# Patient Record
Sex: FEMALE | Race: WHITE | NOT HISPANIC OR LATINO | Employment: FULL TIME | URBAN - NONMETROPOLITAN AREA
[De-identification: names, ages, dates, MRNs, and addresses within clinical notes are randomized per-mention and may not be internally consistent; named-entity substitution may affect disease eponyms.]

---

## 2018-02-23 ENCOUNTER — LAB REQUISITION (OUTPATIENT)
Dept: LAB | Facility: HOSPITAL | Age: 38
End: 2018-02-23

## 2018-02-23 DIAGNOSIS — Z00.00 ENCOUNTER FOR GENERAL ADULT MEDICAL EXAMINATION WITHOUT ABNORMAL FINDINGS: ICD-10-CM

## 2018-02-23 PROCEDURE — 88305 TISSUE EXAM BY PATHOLOGIST: CPT | Performed by: PLASTIC SURGERY

## 2018-02-27 LAB
CYTO UR: NORMAL
DX PRELIMINARY: NORMAL
LAB AP CASE REPORT: NORMAL
LAB AP CLINICAL INFORMATION: NORMAL
Lab: NORMAL
PATH REPORT.FINAL DX SPEC: NORMAL
PATH REPORT.GROSS SPEC: NORMAL

## 2018-05-11 ENCOUNTER — TRANSCRIBE ORDERS (OUTPATIENT)
Dept: ADMINISTRATIVE | Facility: HOSPITAL | Age: 38
End: 2018-05-11

## 2018-05-11 ENCOUNTER — LAB (OUTPATIENT)
Dept: LAB | Facility: HOSPITAL | Age: 38
End: 2018-05-11

## 2018-05-11 DIAGNOSIS — Z11.3 SCREENING EXAMINATION FOR VENEREAL DISEASE: Primary | ICD-10-CM

## 2018-05-11 DIAGNOSIS — Z11.3 SCREENING EXAMINATION FOR VENEREAL DISEASE: ICD-10-CM

## 2018-05-11 DIAGNOSIS — Z71.41 ENCOUNTER FOR ALCOHOLISM COUNSELING: ICD-10-CM

## 2018-05-11 LAB
ABO GROUP BLD: NORMAL
BASOPHILS # BLD AUTO: 0.07 10*3/MM3 (ref 0–0.2)
BASOPHILS NFR BLD AUTO: 1 % (ref 0–2)
DEPRECATED RDW RBC AUTO: 40.3 FL (ref 40–54)
EOSINOPHIL # BLD AUTO: 0.05 10*3/MM3 (ref 0–0.7)
EOSINOPHIL NFR BLD AUTO: 0.7 % (ref 0–4)
ERYTHROCYTE [DISTWIDTH] IN BLOOD BY AUTOMATED COUNT: 11.9 % (ref 12–15)
HBV SURFACE AG SERPL QL IA: NEGATIVE
HCT VFR BLD AUTO: 36.7 % (ref 37–47)
HCV AB SER DONR QL: NEGATIVE
HCV S/C RATIO: 0.01 (ref 0–0.99)
HGB BLD-MCNC: 12.5 G/DL (ref 12–16)
HIV1+2 AB SER QL: NEGATIVE
IMM GRANULOCYTES # BLD: 0.03 10*3/MM3 (ref 0–0.03)
IMM GRANULOCYTES NFR BLD: 0.4 % (ref 0–5)
LYMPHOCYTES # BLD AUTO: 2.2 10*3/MM3 (ref 0.72–4.86)
LYMPHOCYTES NFR BLD AUTO: 32.2 % (ref 15–45)
MCH RBC QN AUTO: 31.5 PG (ref 28–32)
MCHC RBC AUTO-ENTMCNC: 34.1 G/DL (ref 33–36)
MCV RBC AUTO: 92.4 FL (ref 82–98)
MONOCYTES # BLD AUTO: 0.43 10*3/MM3 (ref 0.19–1.3)
MONOCYTES NFR BLD AUTO: 6.3 % (ref 4–12)
NEUTROPHILS # BLD AUTO: 4.06 10*3/MM3 (ref 1.87–8.4)
NEUTROPHILS NFR BLD AUTO: 59.4 % (ref 39–78)
NRBC BLD MANUAL-RTO: 0 /100 WBC (ref 0–0)
PLATELET # BLD AUTO: 279 10*3/MM3 (ref 130–400)
PMV BLD AUTO: 10.7 FL (ref 6–12)
RBC # BLD AUTO: 3.97 10*6/MM3 (ref 4.2–5.4)
RH BLD: POSITIVE
WBC NRBC COR # BLD: 6.84 10*3/MM3 (ref 4.8–10.8)

## 2018-05-11 PROCEDURE — 36415 COLL VENOUS BLD VENIPUNCTURE: CPT

## 2018-05-11 PROCEDURE — 87389 HIV-1 AG W/HIV-1&-2 AB AG IA: CPT | Performed by: OBSTETRICS & GYNECOLOGY

## 2018-05-11 PROCEDURE — 86704 HEP B CORE ANTIBODY TOTAL: CPT | Performed by: OBSTETRICS & GYNECOLOGY

## 2018-05-11 PROCEDURE — 86803 HEPATITIS C AB TEST: CPT | Performed by: OBSTETRICS & GYNECOLOGY

## 2018-05-11 PROCEDURE — 85025 COMPLETE CBC W/AUTO DIFF WBC: CPT | Performed by: OBSTETRICS & GYNECOLOGY

## 2018-05-11 PROCEDURE — 86592 SYPHILIS TEST NON-TREP QUAL: CPT | Performed by: OBSTETRICS & GYNECOLOGY

## 2018-05-11 PROCEDURE — 87591 N.GONORRHOEAE DNA AMP PROB: CPT | Performed by: OBSTETRICS & GYNECOLOGY

## 2018-05-11 PROCEDURE — 87340 HEPATITIS B SURFACE AG IA: CPT | Performed by: OBSTETRICS & GYNECOLOGY

## 2018-05-11 PROCEDURE — 86901 BLOOD TYPING SEROLOGIC RH(D): CPT | Performed by: OBSTETRICS & GYNECOLOGY

## 2018-05-11 PROCEDURE — 87491 CHLMYD TRACH DNA AMP PROBE: CPT | Performed by: OBSTETRICS & GYNECOLOGY

## 2018-05-11 PROCEDURE — 86900 BLOOD TYPING SEROLOGIC ABO: CPT | Performed by: OBSTETRICS & GYNECOLOGY

## 2018-05-11 PROCEDURE — G0432 EIA HIV-1/HIV-2 SCREEN: HCPCS | Performed by: OBSTETRICS & GYNECOLOGY

## 2018-05-12 LAB
HBV CORE AB SER DONR QL IA: NEGATIVE
RPR SER QL: NON REACTIVE

## 2018-05-14 LAB
C TRACH RRNA SPEC DONR QL NAA+PROBE: NEGATIVE
N GONORRHOEA DNA SPEC QL NAA+PROBE: NEGATIVE

## 2018-05-15 LAB — HIV 1+2 AB+HIV1 P24 AG SERPL QL IA: NON REACTIVE

## 2018-12-07 ENCOUNTER — OFFICE VISIT (OUTPATIENT)
Dept: OBSTETRICS AND GYNECOLOGY | Facility: CLINIC | Age: 38
End: 2018-12-07

## 2018-12-07 VITALS
BODY MASS INDEX: 25.33 KG/M2 | SYSTOLIC BLOOD PRESSURE: 116 MMHG | WEIGHT: 171 LBS | HEIGHT: 69 IN | DIASTOLIC BLOOD PRESSURE: 74 MMHG

## 2018-12-07 DIAGNOSIS — Z01.419 WELL WOMAN EXAM WITH ROUTINE GYNECOLOGICAL EXAM: Primary | ICD-10-CM

## 2018-12-07 DIAGNOSIS — M19.90 ARTHRITIS: ICD-10-CM

## 2018-12-07 PROCEDURE — 87624 HPV HI-RISK TYP POOLED RSLT: CPT | Performed by: OBSTETRICS & GYNECOLOGY

## 2018-12-07 PROCEDURE — G0123 SCREEN CERV/VAG THIN LAYER: HCPCS | Performed by: OBSTETRICS & GYNECOLOGY

## 2018-12-07 PROCEDURE — 99395 PREV VISIT EST AGE 18-39: CPT | Performed by: OBSTETRICS & GYNECOLOGY

## 2018-12-07 RX ORDER — VALACYCLOVIR HYDROCHLORIDE 500 MG/1
500 TABLET, FILM COATED ORAL DAILY
COMMUNITY
End: 2019-10-03 | Stop reason: SDUPTHER

## 2018-12-07 RX ORDER — DESOGESTREL AND ETHINYL ESTRADIOL 0.15-0.03
1 KIT ORAL DAILY
Qty: 28 TABLET | Refills: 12 | Status: SHIPPED | OUTPATIENT
Start: 2018-12-07 | End: 2022-03-30

## 2018-12-12 LAB
GEN CATEG CVX/VAG CYTO-IMP: NORMAL
LAB AP CASE REPORT: NORMAL
LAB AP GYN ADDITIONAL INFORMATION: NORMAL
PATH INTERP SPEC-IMP: NORMAL
STAT OF ADQ CVX/VAG CYTO-IMP: NORMAL

## 2018-12-17 LAB — HPV I/H RISK 4 DNA CVX QL PROBE+SIG AMP: NEGATIVE

## 2018-12-17 NOTE — PROGRESS NOTES
Clifton Macdonald is a 38 y.o. y/o female.     Chief Complaint: Here for a Pap smear    HPI:   38 y.o. y/o No obstetric history on file..  No LMP recorded..  Patient is here for Pap smear is actively undergoing IVF therapy.  Discussed this and implications on ovarian cancer and other health issues.  Questions answered    She does complain of some arthritis-like symptoms joint tenderness and swelling not terribly bad but does come and go and worse in the late nica          Review of Systems   Breast ROS: negative for breast lumps ROS:                                                                                                                                  Neuro no history of brain tumor    HENT no history of ear tumors    Eye no history of retinal tumors    Pulmonary no history of lung tumors    Cardiac no history of cardiac tumors    GI: No history of small bowel tumors    Musculoskeletal: No history of skeletal muscle tumors    Endocrine: No history of adrenal tumors    Lymphatic: No history of Hodgkin's disease    Renal: No history of renal cancer    The following portions of the patient's history were reviewed and updated as appropriate: allergies, current medications, past family history, past medical history, past social history, past surgical history and problem list.    Allergies   Allergen Reactions   • Other Hives     Pt states she had a reaction to general anesthesia    • Neomycin Rash        Outpatient Medications Prior to Visit   Medication Sig Dispense Refill   • DAILY MULTIPLE VITAMINS PO Take 1 tablet by mouth Daily.     • valACYclovir (VALTREX) 500 MG tablet Take 500 mg by mouth Daily.     • Vortioxetine HBr (TRINTELLIX) 5 MG tablet Take 5 mg by mouth Daily.       No facility-administered medications prior to visit.         The patient has a family history of   History reviewed. No pertinent family history.     Past Medical History:   Diagnosis Date   • Herpes    • Migraine         OB History   "   No data available           Social History     Socioeconomic History   • Marital status:      Spouse name: Not on file   • Number of children: Not on file   • Years of education: Not on file   • Highest education level: Not on file   Social Needs   • Financial resource strain: Not on file   • Food insecurity - worry: Not on file   • Food insecurity - inability: Not on file   • Transportation needs - medical: Not on file   • Transportation needs - non-medical: Not on file   Occupational History   • Not on file   Tobacco Use   • Smoking status: Never Smoker   • Smokeless tobacco: Never Used   Substance and Sexual Activity   • Alcohol use: Yes     Comment: socially   • Drug use: No   • Sexual activity: Yes     Partners: Male     Birth control/protection: OCP   Other Topics Concern   • Not on file   Social History Narrative   • Not on file        Past Surgical History:   Procedure Laterality Date   • BREAST AUGMENTATION     • DILATATION AND CURETTAGE     • VULVA SURGERY          There is no problem list on file for this patient.       Documented Vitals    12/07/18 0920   BP: 116/74   Weight: 77.6 kg (171 lb)   Height: 175.3 cm (69\")   PainSc: 0-No pain        Body mass index is 25.25 kg/m².    Physical Exam   Constitutional: She is oriented to person, place, and time. She appears well-developed and well-nourished.   HENT:   Head: Normocephalic and atraumatic.   Eyes: EOM are normal. Pupils are equal, round, and reactive to light.   Neck: Normal range of motion. Neck supple.   Cardiovascular: Normal rate and regular rhythm.   Pulmonary/Chest: Effort normal.   Abdominal: Bowel sounds are normal.   Genitourinary: Vagina normal.   Genitourinary Comments: Cervix no gross lesion Pap smear performed bimanual examination I can detect no pelvic enlargement   Musculoskeletal: Normal range of motion.   Neurological: She is alert and oriented to person, place, and time.   Skin: Skin is warm and dry. Capillary refill takes " less than 2 seconds.   Psychiatric: She has a normal mood and affect.       Assessment        Diagnosis Plan   1. Well woman exam with routine gynecological exam  TSH    T4, Free    Vitamin D 25 Hydroxy    Vitamin B12    CBC & Differential    Comprehensive Metabolic Panel    RHEUMATOID FACTOR    CBC & Differential    Liquid-based Pap Smear, Screening    HPV DNA Probe, Direct - ThinPrep Vial, Vagina    HPV DNA Probe, Direct - ThinPrep Vial, Cervix   2. Arthritis  CBC & Differential         Plan         New Medications Ordered This Visit   Medications   • desogestrel-ethinyl estradiol (APRI) 0.15-30 MG-MCG per tablet     Sig: Take 1 tablet by mouth Daily.     Dispense:  28 tablet     Refill:  12     1. Encouraged in diet and exercise.  2. Handouts on depression, hot flashes, exercise, and vitamin use.   3. Follow-up in 1 year.  Follow-up sooner as needed.          This document has been electronically signed by Lorenzo Briceno MD on December 16, 2018 11:26 PM

## 2019-01-02 DIAGNOSIS — E53.8 LOW SERUM VITAMIN B12: Primary | ICD-10-CM

## 2019-01-03 DIAGNOSIS — R53.83 OTHER FATIGUE: Primary | ICD-10-CM

## 2019-03-28 RX ORDER — UBIDECARENONE 75 MG
50 CAPSULE ORAL DAILY
Qty: 30 TABLET | Refills: 5 | Status: SHIPPED | OUTPATIENT
Start: 2019-03-28 | End: 2019-04-27

## 2019-10-03 RX ORDER — VALACYCLOVIR HYDROCHLORIDE 500 MG/1
TABLET, FILM COATED ORAL
Qty: 30 TABLET | Refills: 1 | Status: SHIPPED | OUTPATIENT
Start: 2019-10-03 | End: 2020-06-24

## 2019-12-08 DIAGNOSIS — O20.0 THREATENED ABORTION: Primary | ICD-10-CM

## 2019-12-09 ENCOUNTER — OFFICE VISIT (OUTPATIENT)
Dept: OBSTETRICS AND GYNECOLOGY | Facility: CLINIC | Age: 39
End: 2019-12-09

## 2019-12-09 ENCOUNTER — LAB (OUTPATIENT)
Dept: LAB | Facility: HOSPITAL | Age: 39
End: 2019-12-09

## 2019-12-09 VITALS
SYSTOLIC BLOOD PRESSURE: 130 MMHG | DIASTOLIC BLOOD PRESSURE: 80 MMHG | BODY MASS INDEX: 24.07 KG/M2 | WEIGHT: 158.8 LBS | HEIGHT: 68 IN

## 2019-12-09 DIAGNOSIS — O03.4 INCOMPLETE ABORTION: Primary | ICD-10-CM

## 2019-12-09 DIAGNOSIS — R53.83 OTHER FATIGUE: ICD-10-CM

## 2019-12-09 DIAGNOSIS — O20.0 THREATENED ABORTION: ICD-10-CM

## 2019-12-09 DIAGNOSIS — O20.0 THREATENED ABORTION: Primary | ICD-10-CM

## 2019-12-09 DIAGNOSIS — E53.8 LOW SERUM VITAMIN B12: ICD-10-CM

## 2019-12-09 LAB
BASOPHILS # BLD AUTO: 0.06 10*3/MM3 (ref 0–0.2)
BASOPHILS NFR BLD AUTO: 0.6 % (ref 0–1.5)
DEPRECATED RDW RBC AUTO: 39.2 FL (ref 37–54)
EOSINOPHIL # BLD AUTO: 0.08 10*3/MM3 (ref 0–0.4)
EOSINOPHIL NFR BLD AUTO: 0.7 % (ref 0.3–6.2)
ERYTHROCYTE [DISTWIDTH] IN BLOOD BY AUTOMATED COUNT: 12.1 % (ref 12.3–15.4)
HCG INTACT+B SERPL-ACNC: NORMAL MIU/ML
HCT VFR BLD AUTO: 39.3 % (ref 34–46.6)
HGB BLD-MCNC: 13.6 G/DL (ref 12–15.9)
IMM GRANULOCYTES # BLD AUTO: 0.03 10*3/MM3 (ref 0–0.05)
IMM GRANULOCYTES NFR BLD AUTO: 0.3 % (ref 0–0.5)
LYMPHOCYTES # BLD AUTO: 3.73 10*3/MM3 (ref 0.7–3.1)
LYMPHOCYTES NFR BLD AUTO: 34.5 % (ref 19.6–45.3)
MAGNESIUM SERPL-MCNC: 2.1 MG/DL (ref 1.6–2.6)
MCH RBC QN AUTO: 30.7 PG (ref 26.6–33)
MCHC RBC AUTO-ENTMCNC: 34.6 G/DL (ref 31.5–35.7)
MCV RBC AUTO: 88.7 FL (ref 79–97)
MONOCYTES # BLD AUTO: 0.49 10*3/MM3 (ref 0.1–0.9)
MONOCYTES NFR BLD AUTO: 4.5 % (ref 5–12)
NEUTROPHILS # BLD AUTO: 6.42 10*3/MM3 (ref 1.7–7)
NEUTROPHILS NFR BLD AUTO: 59.4 % (ref 42.7–76)
NRBC BLD AUTO-RTO: 0 /100 WBC (ref 0–0.2)
PLATELET # BLD AUTO: 301 10*3/MM3 (ref 140–450)
PMV BLD AUTO: 11.4 FL (ref 6–12)
RBC # BLD AUTO: 4.43 10*6/MM3 (ref 3.77–5.28)
WBC NRBC COR # BLD: 10.81 10*3/MM3 (ref 3.4–10.8)

## 2019-12-09 PROCEDURE — 36415 COLL VENOUS BLD VENIPUNCTURE: CPT

## 2019-12-09 PROCEDURE — 88305 TISSUE EXAM BY PATHOLOGIST: CPT | Performed by: PATHOLOGY

## 2019-12-09 PROCEDURE — 99214 OFFICE O/P EST MOD 30 MIN: CPT | Performed by: OBSTETRICS & GYNECOLOGY

## 2019-12-09 PROCEDURE — 83735 ASSAY OF MAGNESIUM: CPT

## 2019-12-09 PROCEDURE — 86901 BLOOD TYPING SEROLOGIC RH(D): CPT

## 2019-12-09 PROCEDURE — 88342 IMHCHEM/IMCYTCHM 1ST ANTB: CPT | Performed by: PATHOLOGY

## 2019-12-09 PROCEDURE — 85025 COMPLETE CBC W/AUTO DIFF WBC: CPT

## 2019-12-09 PROCEDURE — 88305 TISSUE EXAM BY PATHOLOGIST: CPT | Performed by: OBSTETRICS & GYNECOLOGY

## 2019-12-09 PROCEDURE — 84702 CHORIONIC GONADOTROPIN TEST: CPT

## 2019-12-09 PROCEDURE — 86850 RBC ANTIBODY SCREEN: CPT

## 2019-12-09 PROCEDURE — 86900 BLOOD TYPING SEROLOGIC ABO: CPT

## 2019-12-09 PROCEDURE — 82746 ASSAY OF FOLIC ACID SERUM: CPT

## 2019-12-09 RX ORDER — PRENATAL VIT/IRON FUM/FOLIC AC 27MG-0.8MG
TABLET ORAL DAILY
COMMUNITY

## 2019-12-09 RX ORDER — OXYCODONE AND ACETAMINOPHEN 10; 325 MG/1; MG/1
1 TABLET ORAL EVERY 6 HOURS PRN
Qty: 18 TABLET | Refills: 0 | Status: SHIPPED | OUTPATIENT
Start: 2019-12-09 | End: 2021-08-09

## 2019-12-09 RX ORDER — CYANOCOBALAMIN 1000 UG/ML
1000 INJECTION, SOLUTION INTRAMUSCULAR; SUBCUTANEOUS
Refills: 5 | COMMUNITY
Start: 2019-11-26 | End: 2021-08-09 | Stop reason: HOSPADM

## 2019-12-09 RX ORDER — SYRINGE WITH NEEDLE, 1 ML 25GX5/8"
SYRINGE, EMPTY DISPOSABLE MISCELLANEOUS
Refills: 11 | COMMUNITY
Start: 2019-11-26 | End: 2021-08-09 | Stop reason: HOSPADM

## 2019-12-10 LAB
ABO GROUP BLD: NORMAL
BLD GP AB SCN SERPL QL: NEGATIVE
FOLATE SERPL-MCNC: >20 NG/ML (ref 4.78–24.2)
Lab: NORMAL
RH BLD: POSITIVE
T&S EXPIRATION DATE: NORMAL

## 2019-12-11 ENCOUNTER — LAB (OUTPATIENT)
Dept: LAB | Facility: HOSPITAL | Age: 39
End: 2019-12-11

## 2019-12-11 DIAGNOSIS — O03.4 INCOMPLETE ABORTION: Primary | ICD-10-CM

## 2019-12-11 DIAGNOSIS — O03.4 INCOMPLETE ABORTION: ICD-10-CM

## 2019-12-11 LAB
BASOPHILS # BLD AUTO: 0.06 10*3/MM3 (ref 0–0.2)
BASOPHILS NFR BLD AUTO: 0.8 % (ref 0–1.5)
DEPRECATED RDW RBC AUTO: 39.7 FL (ref 37–54)
EOSINOPHIL # BLD AUTO: 0.16 10*3/MM3 (ref 0–0.4)
EOSINOPHIL NFR BLD AUTO: 2.2 % (ref 0.3–6.2)
ERYTHROCYTE [DISTWIDTH] IN BLOOD BY AUTOMATED COUNT: 12.2 % (ref 12.3–15.4)
HCG INTACT+B SERPL-ACNC: 3584 MIU/ML
HCG SERPL QL: POSITIVE
HCT VFR BLD AUTO: 37.5 % (ref 34–46.6)
HGB BLD-MCNC: 12.8 G/DL (ref 12–15.9)
IMM GRANULOCYTES # BLD AUTO: 0.02 10*3/MM3 (ref 0–0.05)
IMM GRANULOCYTES NFR BLD AUTO: 0.3 % (ref 0–0.5)
LAB AP CASE REPORT: NORMAL
LAB AP INTRADEPARTMENTAL CONSULT: NORMAL
LAB AP SPECIAL STAINS: NORMAL
LYMPHOCYTES # BLD AUTO: 3.07 10*3/MM3 (ref 0.7–3.1)
LYMPHOCYTES NFR BLD AUTO: 42.4 % (ref 19.6–45.3)
MCH RBC QN AUTO: 30.4 PG (ref 26.6–33)
MCHC RBC AUTO-ENTMCNC: 34.1 G/DL (ref 31.5–35.7)
MCV RBC AUTO: 89.1 FL (ref 79–97)
MONOCYTES # BLD AUTO: 0.4 10*3/MM3 (ref 0.1–0.9)
MONOCYTES NFR BLD AUTO: 5.5 % (ref 5–12)
NEUTROPHILS # BLD AUTO: 3.53 10*3/MM3 (ref 1.7–7)
NEUTROPHILS NFR BLD AUTO: 48.8 % (ref 42.7–76)
NRBC BLD AUTO-RTO: 0 /100 WBC (ref 0–0.2)
PATH REPORT.FINAL DX SPEC: NORMAL
PATH REPORT.GROSS SPEC: NORMAL
PLATELET # BLD AUTO: 272 10*3/MM3 (ref 140–450)
PMV BLD AUTO: 11 FL (ref 6–12)
RBC # BLD AUTO: 4.21 10*6/MM3 (ref 3.77–5.28)
WBC NRBC COR # BLD: 7.24 10*3/MM3 (ref 3.4–10.8)

## 2019-12-11 PROCEDURE — 36415 COLL VENOUS BLD VENIPUNCTURE: CPT

## 2019-12-11 PROCEDURE — 84703 CHORIONIC GONADOTROPIN ASSAY: CPT

## 2019-12-11 PROCEDURE — 85025 COMPLETE CBC W/AUTO DIFF WBC: CPT

## 2019-12-11 PROCEDURE — 84702 CHORIONIC GONADOTROPIN TEST: CPT

## 2019-12-13 ENCOUNTER — LAB (OUTPATIENT)
Dept: LAB | Facility: HOSPITAL | Age: 39
End: 2019-12-13

## 2019-12-13 ENCOUNTER — OFFICE VISIT (OUTPATIENT)
Dept: OBSTETRICS AND GYNECOLOGY | Facility: CLINIC | Age: 39
End: 2019-12-13

## 2019-12-13 VITALS
HEIGHT: 68 IN | SYSTOLIC BLOOD PRESSURE: 120 MMHG | WEIGHT: 160 LBS | BODY MASS INDEX: 24.25 KG/M2 | DIASTOLIC BLOOD PRESSURE: 78 MMHG

## 2019-12-13 DIAGNOSIS — Z00.00 ENCOUNTER FOR WELLNESS EXAMINATION: ICD-10-CM

## 2019-12-13 DIAGNOSIS — O03.4 INCOMPLETE ABORTION: Primary | ICD-10-CM

## 2019-12-13 DIAGNOSIS — O03.4 INCOMPLETE ABORTION: ICD-10-CM

## 2019-12-13 LAB — HCG INTACT+B SERPL-ACNC: 2181 MIU/ML

## 2019-12-13 PROCEDURE — 99396 PREV VISIT EST AGE 40-64: CPT | Performed by: OBSTETRICS & GYNECOLOGY

## 2019-12-13 PROCEDURE — 36415 COLL VENOUS BLD VENIPUNCTURE: CPT

## 2019-12-13 PROCEDURE — 84702 CHORIONIC GONADOTROPIN TEST: CPT

## 2019-12-13 NOTE — PROGRESS NOTES
"Chief complaint here for well woman visit                                                                                                                                                                                                                                                               sUBJECTIVE: P39 y.o. female for annual routine Pap and checkupatient here for well woman visit.  She is up on her cervical cytology.  She uses her seatbelts.  History of HSV no recent recurrence on suppression.  She wants to start oral contraceptives does not smoke.  Relatively good health oral contraceptive review of systems negative.    Recent miscarriage from spontaneous conception though has been undergoing IVF.  hCG decreasing rapidly repeat ~0    Current Outpatient Medications   Medication Sig Dispense Refill   • B-D 3CC LUER-ILENE SYR 25GX1\" 25G X 1\" 3 ML misc USE AS DIRECTED WITH B-12 INJECTION  11   • Cyanocobalamin (VITAMIN B 12 PO) Take  by mouth.     • cyanocobalamin 1000 MCG/ML injection 1,000 mcg. Every 3 weeks  5   • Cyanocobalamin 1000 MCG/ML kit INJECT 1ML INTRAMUSCULARLY EVERY WEEK     • DAILY MULTIPLE VITAMINS PO Take 1 tablet by mouth Daily.     • oxyCODONE-acetaminophen (PERCOCET)  MG per tablet Take 1 tablet by mouth Every 6 (Six) Hours As Needed for Moderate Pain . 18 tablet 0   • Prenatal Vit-Fe Fumarate-FA (PRENATAL VITAMIN 27-0.8) 27-0.8 MG tablet tablet Take  by mouth Daily.     • valACYclovir (VALTREX) 500 MG tablet TAKE 1 TABLET BY MOUTH EVERY DAY FOR 30 DAYS 30 tablet 1   • Vortioxetine HBr (TRINTELLIX) 5 MG tablet Take 5 mg by mouth Daily.       No current facility-administered medications for this visit.      Allergies: Neomycin   Patient's last menstrual period was 09/28/2019.    ROS:  Feeling well. No dyspnea or chest pain on exertion.  No abdominal pain, change in bowel habits, black or bloody stools.  No urinary tract symptoms. GYN ROS: she complains of   . No neurological " "complaints.    OBJECTIVE:   The patient appears well, alert, oriented x 3, in no distress.  /78 (BP Location: Left arm, Patient Position: Sitting, Cuff Size: Adult)   Ht 172.7 cm (68\")   Wt 72.6 kg (160 lb)   LMP 09/28/2019   BMI 24.33 kg/m²   ENT normal.  Neck supple. No adenopathy or thyromegaly. MAJOR. Lungs are clear, good air entry, no wheezes, rhonchi or rales. S1 and S2 normal, no murmurs, regular rate and rhythm. Abdomen soft without tenderness, guarding, mass or organomegaly. Extremities show no edema, normal peripheral pulses. Neurological is normal, no focal findings.    BREAST EXAM: breasts appear normal, no suspicious masses, no skin or nipple changes or axillary nodes, not examined    PELVIC EXAM external genitalia normal moderate amount of blood in vault cervix closed uterus moderately enlarged I am to take no adnexal enlargement    ASSESSMENT:   well woman; recent apparent spontaneous miscarriage    PLAN:   Well woman examination see back 1 year with mammogram.;  Recent spontaneous miscarriage follow-up beta hCG down to 0  "

## 2019-12-14 NOTE — PROGRESS NOTES
"Clifton Macdonald is a 39 y.o. y/o female.     Chief Complaint: Heavy bleeding early pregnancy    HPI:   39 y.o. .  Patient's last menstrual period was 2019..  Patient with positive pregnancy test.  She had been trying to conceive and in fact had had a couple of cycles of IVF at [facility] without success.  Then conceived spontaneously.  Began to have some spotting over the weekend I had spoken with her.  Plan was to obtain laboratory studies do not want emergency evaluation as course could not be changed.  Had increasing bleeding the afternoon of 2019 she was bringing her child to [facility] for a sleep study.  I had her come to the office.  She was having large amounts of bright red vaginal bleeding for about 2 hours.  Heavier than a pad an hour much heavier than a.  No clots no definite tissue.  Associated crampy pain 8 of 10          Review of Systems   ROS:  CNS: No history of brain malignancy.  HEENT: No history of throat cancer.  Eye: No history of retinal cancer.  Pulmonary: No history of lung cancer.                                                                                 Cardiovascular: No history of cardiac tumors.  Gastrointestinal: No history of small bowel tumors.  Renal: No history of kidney malignancy.  Musculoskeletal: No history of smooth muscle tumors.  Lymphatics: No history of Hodgkin's disease.  Endocrine: No history of thyroid malignancy.    The following portions of the patient's history were reviewed and updated as appropriate: allergies, current medications, past family history, past medical history, past social history, past surgical history and problem list.    Allergies   Allergen Reactions   • Neomycin Rash        Outpatient Medications Prior to Visit   Medication Sig Dispense Refill   • B-D 3CC LUER-ILENE SYR 25GX1\" 25G X 1\" 3 ML misc USE AS DIRECTED WITH B-12 INJECTION  11   • Cyanocobalamin (VITAMIN B 12 PO) Take  by mouth.     • cyanocobalamin 1000 " MCG/ML injection 1,000 mcg. Every 3 weeks  5   • Cyanocobalamin 1000 MCG/ML kit INJECT 1ML INTRAMUSCULARLY EVERY WEEK     • Prenatal Vit-Fe Fumarate-FA (PRENATAL VITAMIN 27-0.8) 27-0.8 MG tablet tablet Take  by mouth Daily.     • valACYclovir (VALTREX) 500 MG tablet TAKE 1 TABLET BY MOUTH EVERY DAY FOR 30 DAYS 30 tablet 1   • DAILY MULTIPLE VITAMINS PO Take 1 tablet by mouth Daily.     • Vortioxetine HBr (TRINTELLIX) 5 MG tablet Take 5 mg by mouth Daily.       No facility-administered medications prior to visit.         The patient has a family history of   Family History   Problem Relation Age of Onset   • No Known Problems Father    • Rheum arthritis Mother    • No Known Problems Brother    • No Known Problems Son    • No Known Problems Daughter    • Rheum arthritis Maternal Grandmother    • No Known Problems Daughter         Past Medical History:   Diagnosis Date   • Herpes    • Migraine         OB History        1    Para        Term                AB        Living           SAB        TAB        Ectopic        Molar        Multiple        Live Births                     Social History     Socioeconomic History   • Marital status:      Spouse name: Not on file   • Number of children: Not on file   • Years of education: Not on file   • Highest education level: Not on file   Tobacco Use   • Smoking status: Never Smoker   • Smokeless tobacco: Never Used   Substance and Sexual Activity   • Alcohol use: Not Currently     Comment: socially   • Drug use: No   • Sexual activity: Yes     Partners: Male     Birth control/protection: OCP        Past Surgical History:   Procedure Laterality Date   • BREAST AUGMENTATION     • DILATATION AND CURETTAGE     • OTHER SURGICAL HISTORY  10/2018    IVF   • VULVA SURGERY          Patient Active Problem List   Diagnosis   • Encounter for wellness examination   • Incomplete         Documented Vitals    19 1617   BP: 130/80   Weight: 72 kg (158 lb 12.8  "oz)   Height: 172.7 cm (68\")   PainSc:   6   PainLoc: Abdomen        Body mass index is 24.15 kg/m².    Physical Exam  Constitutional: Appears to be in no acute distress; Eyes: sclera normal; Endocrine system: thyroid palpate is normal; Pulmonary system: lungs clear; Cardiovascular system: heart regular rate and rhythm; Gastrointestinal system: abdomen soft nontender, active bowel sounds; Urologic system: CVA negative; Psychiatric: appropriate insight; Neurologic: gait within normal limits female genital system external genitalia with some blood on labia large amount of bright red blood in vault.  On clearing this with proctoscopy swabs there was some tissue per cervical loss which was definitely not blood clot this was removed sent for pathologic examination after this was done the bleeding substantially decreased and patient reported cramping decreased bimanual examination not done    Laboratory Data:   No results for input(s): GLUCOSE, BUN, CREATININE, NA, K, CL, CO2, CALCIUM, PROTEINTOT, ALBUMIN, ALT, AST, ALKPHOS, BILITOT, EGFRIFNONA, GLOB, AGRATIO, BCR, ANIONGAP, BILIDIR, INDBILI in the last 08660 hours.  Lab Results - Last 18 Months   Lab Units 19  1756 19  1602   WBC 10*3/mm3 7.24 10.81*   RBC 10*6/mm3 4.21 4.43   HEMOGLOBIN g/dL 12.8 13.6   HEMATOCRIT % 37.5 39.3   MCV fL 89.1 88.7   MCH pg 30.4 30.7   MCHC g/dL 34.1 34.6   RDW % 12.2* 12.1*   RDW-SD fl 39.7 39.2   MPV fL 11.0 11.4   PLATELETS 10*3/mm3 272 301     Lab Results - Last 18 Months   Lab Units 19  1756   HCG QUALITATIVE  Positive*       Assessment        Diagnosis Plan   1. Incomplete   Tissue Pathology Exam    CBC Auto Differential    hCG, Serum, Qualitative    oxyCODONE-acetaminophen (PERCOCET)  MG per tablet         Plan  Incomplete possibly now complete  bleeding has slowed down after removing tissue from office we observe the patient for about 30 minutes I discussed admission to observation bed " possible suction D&C but she wants to go home which I think is reasonable.  She is now let me know overnight if she has return of heavy bleeding fever chills or other problems if not we will follow her up on Friday.  I have looked at her blood type recently and is Rh+       New Medications Ordered This Visit   Medications   • oxyCODONE-acetaminophen (PERCOCET)  MG per tablet     Sig: Take 1 tablet by mouth Every 6 (Six) Hours As Needed for Moderate Pain .     Dispense:  18 tablet     Refill:  0             This document has been electronically signed by Lorenzo Briceno MD on December 14, 2019 10:27 AM    Please note that portions of this note were completed with a voice recognition program.

## 2019-12-20 ENCOUNTER — LAB (OUTPATIENT)
Dept: LAB | Facility: HOSPITAL | Age: 39
End: 2019-12-20

## 2019-12-20 DIAGNOSIS — O03.4 INCOMPLETE ABORTION: ICD-10-CM

## 2019-12-20 PROCEDURE — 36415 COLL VENOUS BLD VENIPUNCTURE: CPT

## 2019-12-20 PROCEDURE — 84702 CHORIONIC GONADOTROPIN TEST: CPT | Performed by: OBSTETRICS & GYNECOLOGY

## 2019-12-21 ENCOUNTER — TELEPHONE (OUTPATIENT)
Dept: OBSTETRICS AND GYNECOLOGY | Facility: CLINIC | Age: 39
End: 2019-12-21

## 2019-12-21 DIAGNOSIS — O03.4 INCOMPLETE ABORTION: Primary | ICD-10-CM

## 2019-12-21 LAB — HCG INTACT+B SERPL-ACNC: 533 MIU/ML

## 2019-12-26 ENCOUNTER — LAB (OUTPATIENT)
Dept: LAB | Facility: HOSPITAL | Age: 39
End: 2019-12-26

## 2019-12-26 DIAGNOSIS — O03.4 INCOMPLETE ABORTION: ICD-10-CM

## 2019-12-26 DIAGNOSIS — O03.4 INCOMPLETE ABORTION: Primary | ICD-10-CM

## 2019-12-26 PROCEDURE — 36415 COLL VENOUS BLD VENIPUNCTURE: CPT | Performed by: OBSTETRICS & GYNECOLOGY

## 2019-12-26 PROCEDURE — 84702 CHORIONIC GONADOTROPIN TEST: CPT | Performed by: OBSTETRICS & GYNECOLOGY

## 2019-12-27 DIAGNOSIS — O03.4 INCOMPLETE ABORTION: Primary | ICD-10-CM

## 2019-12-27 LAB — HCG INTACT+B SERPL-ACNC: 183.2 MIU/ML

## 2020-01-10 ENCOUNTER — LAB (OUTPATIENT)
Dept: LAB | Facility: HOSPITAL | Age: 40
End: 2020-01-10

## 2020-01-10 DIAGNOSIS — D64.9 ANEMIA, UNSPECIFIED TYPE: Primary | ICD-10-CM

## 2020-01-10 DIAGNOSIS — D64.9 ANEMIA, UNSPECIFIED TYPE: ICD-10-CM

## 2020-01-10 LAB — HCG INTACT+B SERPL-ACNC: 6.37 MIU/ML

## 2020-01-10 PROCEDURE — 84702 CHORIONIC GONADOTROPIN TEST: CPT | Performed by: OBSTETRICS & GYNECOLOGY

## 2020-01-10 PROCEDURE — 82607 VITAMIN B-12: CPT | Performed by: OBSTETRICS & GYNECOLOGY

## 2020-01-10 PROCEDURE — 36415 COLL VENOUS BLD VENIPUNCTURE: CPT | Performed by: OBSTETRICS & GYNECOLOGY

## 2020-01-11 LAB — VIT B12 BLD-MCNC: 1225 PG/ML (ref 211–946)

## 2020-06-24 RX ORDER — VALACYCLOVIR HYDROCHLORIDE 500 MG/1
TABLET, FILM COATED ORAL
Qty: 30 TABLET | Refills: 6 | Status: SHIPPED | OUTPATIENT
Start: 2020-06-24 | End: 2021-09-02

## 2021-05-03 DIAGNOSIS — N91.2 AMENORRHEA: Primary | ICD-10-CM

## 2021-05-07 ENCOUNTER — LAB (OUTPATIENT)
Dept: LAB | Facility: HOSPITAL | Age: 41
End: 2021-05-07

## 2021-05-07 DIAGNOSIS — N91.2 AMENORRHEA: ICD-10-CM

## 2021-05-07 PROCEDURE — 36415 COLL VENOUS BLD VENIPUNCTURE: CPT

## 2021-05-07 PROCEDURE — 84702 CHORIONIC GONADOTROPIN TEST: CPT

## 2021-05-08 LAB — HCG INTACT+B SERPL-ACNC: <1 MIU/ML

## 2021-07-13 DIAGNOSIS — O26.899 CRAMPING AFFECTING PREGNANCY, ANTEPARTUM: Primary | ICD-10-CM

## 2021-07-13 DIAGNOSIS — R10.9 CRAMPING AFFECTING PREGNANCY, ANTEPARTUM: Primary | ICD-10-CM

## 2021-07-14 ENCOUNTER — LAB (OUTPATIENT)
Dept: LAB | Facility: HOSPITAL | Age: 41
End: 2021-07-14

## 2021-07-14 DIAGNOSIS — O26.899 CRAMPING AFFECTING PREGNANCY, ANTEPARTUM: ICD-10-CM

## 2021-07-14 DIAGNOSIS — R10.9 CRAMPING AFFECTING PREGNANCY, ANTEPARTUM: ICD-10-CM

## 2021-07-14 LAB — HCG INTACT+B SERPL-ACNC: NORMAL MIU/ML

## 2021-07-14 PROCEDURE — 36415 COLL VENOUS BLD VENIPUNCTURE: CPT

## 2021-07-14 PROCEDURE — 84144 ASSAY OF PROGESTERONE: CPT

## 2021-07-14 PROCEDURE — 84702 CHORIONIC GONADOTROPIN TEST: CPT

## 2021-07-14 PROCEDURE — 83001 ASSAY OF GONADOTROPIN (FSH): CPT

## 2021-07-14 PROCEDURE — 84439 ASSAY OF FREE THYROXINE: CPT | Performed by: OBSTETRICS & GYNECOLOGY

## 2021-07-14 PROCEDURE — 83002 ASSAY OF GONADOTROPIN (LH): CPT

## 2021-07-14 PROCEDURE — 84443 ASSAY THYROID STIM HORMONE: CPT | Performed by: OBSTETRICS & GYNECOLOGY

## 2021-07-15 ENCOUNTER — TELEPHONE (OUTPATIENT)
Dept: OBSTETRICS AND GYNECOLOGY | Facility: CLINIC | Age: 41
End: 2021-07-15

## 2021-07-15 DIAGNOSIS — O20.0 THREATENED ABORTION: Primary | ICD-10-CM

## 2021-07-15 DIAGNOSIS — Z78.9 DATE OF LAST MENSTRUAL PERIOD (LMP) UNKNOWN: Primary | ICD-10-CM

## 2021-07-15 LAB
FSH SERPL-ACNC: <0.3 MIU/ML
LH SERPL-ACNC: <0.3 MIU/ML
PROGEST SERPL-MCNC: 21.7 NG/ML
T4 FREE SERPL-MCNC: 1.12 NG/DL (ref 0.93–1.7)
TSH SERPL DL<=0.05 MIU/L-ACNC: 1.82 UIU/ML (ref 0.27–4.2)

## 2021-07-15 NOTE — TELEPHONE ENCOUNTER
I CALLED THE PATIENT TO LET HER KNOW THAT THE ONLY AVAILABLE APPOINTMENT IN ULTRASOUND TOMORROW WAS AT 4:30 SO I TOOK THAT APPOINTMENT FOR HER. SHE WILL NEED TO CHECK INTO SAME DAY SURGERY AND THEY WILL TAKE HER TO ULTRASOUND. I DIDN'T GET AN ANSWER SO I LEFT HER A MESSAGE TO CALL ME.

## 2021-07-16 ENCOUNTER — HOSPITAL ENCOUNTER (OUTPATIENT)
Dept: ULTRASOUND IMAGING | Facility: HOSPITAL | Age: 41
Discharge: HOME | End: 2021-07-16
Admitting: OBSTETRICS & GYNECOLOGY

## 2021-07-16 DIAGNOSIS — O20.0 THREATENED ABORTION: ICD-10-CM

## 2021-07-16 PROCEDURE — 76817 TRANSVAGINAL US OBSTETRIC: CPT

## 2021-08-09 ENCOUNTER — INITIAL PRENATAL (OUTPATIENT)
Dept: OBSTETRICS AND GYNECOLOGY | Facility: CLINIC | Age: 41
End: 2021-08-09

## 2021-08-09 ENCOUNTER — LAB (OUTPATIENT)
Dept: LAB | Facility: HOSPITAL | Age: 41
End: 2021-08-09

## 2021-08-09 VITALS — SYSTOLIC BLOOD PRESSURE: 128 MMHG | DIASTOLIC BLOOD PRESSURE: 78 MMHG | WEIGHT: 167.8 LBS | BODY MASS INDEX: 25.51 KG/M2

## 2021-08-09 DIAGNOSIS — A60.09 GENITAL HERPES AFFECTING PREGNANCY IN FIRST TRIMESTER: ICD-10-CM

## 2021-08-09 DIAGNOSIS — O09.299 HISTORY OF POSTPARTUM HEMORRHAGE, CURRENTLY PREGNANT: ICD-10-CM

## 2021-08-09 DIAGNOSIS — B01.9 VARICELLA WITHOUT COMPLICATION: ICD-10-CM

## 2021-08-09 DIAGNOSIS — J45.20 MILD INTERMITTENT ASTHMA WITHOUT COMPLICATION: Primary | ICD-10-CM

## 2021-08-09 DIAGNOSIS — O98.311 GENITAL HERPES AFFECTING PREGNANCY IN FIRST TRIMESTER: ICD-10-CM

## 2021-08-09 DIAGNOSIS — Z82.79 FAMILY HISTORY OF TRISOMY 18: ICD-10-CM

## 2021-08-09 DIAGNOSIS — O09.521 MULTIGRAVIDA OF ADVANCED MATERNAL AGE IN FIRST TRIMESTER: Primary | ICD-10-CM

## 2021-08-09 DIAGNOSIS — O09.521 MULTIGRAVIDA OF ADVANCED MATERNAL AGE IN FIRST TRIMESTER: ICD-10-CM

## 2021-08-09 DIAGNOSIS — Z3A.09 9 WEEKS GESTATION OF PREGNANCY: ICD-10-CM

## 2021-08-09 PROBLEM — N60.19 FIBROCYSTIC BREAST DISEASE (FCBD): Status: ACTIVE | Noted: 2018-01-05

## 2021-08-09 PROBLEM — R92.30 DENSE BREASTS: Status: ACTIVE | Noted: 2018-01-05

## 2021-08-09 PROBLEM — Z80.3 FAMILY HISTORY OF MALIGNANT NEOPLASM OF BREAST: Status: ACTIVE | Noted: 2018-01-05

## 2021-08-09 PROBLEM — R92.2 DENSE BREASTS: Status: ACTIVE | Noted: 2018-01-05

## 2021-08-09 PROBLEM — T85.44XA CAPSULAR CONTRACTURE OF BREAST IMPLANT: Status: ACTIVE | Noted: 2018-01-05

## 2021-08-09 LAB
ABO GROUP BLD: NORMAL
AMPHET+METHAMPHET UR QL: NEGATIVE
AMPHETAMINES UR QL: NEGATIVE
BARBITURATES UR QL SCN: NEGATIVE
BASOPHILS # BLD AUTO: 0.06 10*3/MM3 (ref 0–0.2)
BASOPHILS NFR BLD AUTO: 0.7 % (ref 0–1.5)
BENZODIAZ UR QL SCN: NEGATIVE
BILIRUB UR QL STRIP: NEGATIVE
BLD GP AB SCN SERPL QL: NEGATIVE
BUPRENORPHINE SERPL-MCNC: NEGATIVE NG/ML
CANNABINOIDS SERPL QL: NEGATIVE
CLARITY UR: CLEAR
COCAINE UR QL: NEGATIVE
COLOR UR: YELLOW
DEPRECATED RDW RBC AUTO: 40.5 FL (ref 37–54)
EOSINOPHIL # BLD AUTO: 0.08 10*3/MM3 (ref 0–0.4)
EOSINOPHIL NFR BLD AUTO: 1 % (ref 0.3–6.2)
ERYTHROCYTE [DISTWIDTH] IN BLOOD BY AUTOMATED COUNT: 11.9 % (ref 12.3–15.4)
GLUCOSE UR STRIP-MCNC: NEGATIVE MG/DL
HBV SURFACE AG SERPL QL IA: NORMAL
HCT VFR BLD AUTO: 43.1 % (ref 34–46.6)
HCV AB SER DONR QL: NORMAL
HGB BLD-MCNC: 14.8 G/DL (ref 12–15.9)
HGB UR QL STRIP.AUTO: NEGATIVE
HIV1+2 AB SER QL: NORMAL
IMM GRANULOCYTES # BLD AUTO: 0.02 10*3/MM3 (ref 0–0.05)
IMM GRANULOCYTES NFR BLD AUTO: 0.2 % (ref 0–0.5)
KETONES UR QL STRIP: NEGATIVE
LEUKOCYTE ESTERASE UR QL STRIP.AUTO: ABNORMAL
LYMPHOCYTES # BLD AUTO: 2 10*3/MM3 (ref 0.7–3.1)
LYMPHOCYTES NFR BLD AUTO: 24.4 % (ref 19.6–45.3)
Lab: NORMAL
MCH RBC QN AUTO: 31.9 PG (ref 26.6–33)
MCHC RBC AUTO-ENTMCNC: 34.3 G/DL (ref 31.5–35.7)
MCV RBC AUTO: 92.9 FL (ref 79–97)
METHADONE UR QL SCN: NEGATIVE
MONOCYTES # BLD AUTO: 0.45 10*3/MM3 (ref 0.1–0.9)
MONOCYTES NFR BLD AUTO: 5.5 % (ref 5–12)
NEUTROPHILS NFR BLD AUTO: 5.6 10*3/MM3 (ref 1.7–7)
NEUTROPHILS NFR BLD AUTO: 68.2 % (ref 42.7–76)
NITRITE UR QL STRIP: NEGATIVE
NRBC BLD AUTO-RTO: 0 /100 WBC (ref 0–0.2)
OPIATES UR QL: NEGATIVE
OXYCODONE UR QL SCN: NEGATIVE
PCP UR QL SCN: NEGATIVE
PH UR STRIP.AUTO: 7 [PH] (ref 5–8)
PLATELET # BLD AUTO: 301 10*3/MM3 (ref 140–450)
PMV BLD AUTO: 11.1 FL (ref 6–12)
PROPOXYPH UR QL: NEGATIVE
PROT UR QL STRIP: NEGATIVE
RBC # BLD AUTO: 4.64 10*6/MM3 (ref 3.77–5.28)
RH BLD: POSITIVE
SP GR UR STRIP: 1.01 (ref 1–1.03)
TRICYCLICS UR QL SCN: NEGATIVE
UROBILINOGEN UR QL STRIP: ABNORMAL
WBC # BLD AUTO: 8.21 10*3/MM3 (ref 3.4–10.8)

## 2021-08-09 PROCEDURE — 80306 DRUG TEST PRSMV INSTRMNT: CPT | Performed by: OBSTETRICS & GYNECOLOGY

## 2021-08-09 PROCEDURE — 87491 CHLMYD TRACH DNA AMP PROBE: CPT | Performed by: OBSTETRICS & GYNECOLOGY

## 2021-08-09 PROCEDURE — 86762 RUBELLA ANTIBODY: CPT | Performed by: OBSTETRICS & GYNECOLOGY

## 2021-08-09 PROCEDURE — 36415 COLL VENOUS BLD VENIPUNCTURE: CPT

## 2021-08-09 PROCEDURE — 86803 HEPATITIS C AB TEST: CPT | Performed by: OBSTETRICS & GYNECOLOGY

## 2021-08-09 PROCEDURE — 87086 URINE CULTURE/COLONY COUNT: CPT | Performed by: OBSTETRICS & GYNECOLOGY

## 2021-08-09 PROCEDURE — 81003 URINALYSIS AUTO W/O SCOPE: CPT | Performed by: OBSTETRICS & GYNECOLOGY

## 2021-08-09 PROCEDURE — 87661 TRICHOMONAS VAGINALIS AMPLIF: CPT | Performed by: OBSTETRICS & GYNECOLOGY

## 2021-08-09 PROCEDURE — 0501F PRENATAL FLOW SHEET: CPT | Performed by: OBSTETRICS & GYNECOLOGY

## 2021-08-09 PROCEDURE — 87591 N.GONORRHOEAE DNA AMP PROB: CPT | Performed by: OBSTETRICS & GYNECOLOGY

## 2021-08-09 PROCEDURE — 80081 OBSTETRIC PANEL INC HIV TSTG: CPT | Performed by: OBSTETRICS & GYNECOLOGY

## 2021-08-09 RX ORDER — DIPHENHYDRAMINE HCL 25 MG
25 CAPSULE ORAL EVERY 6 HOURS PRN
COMMUNITY

## 2021-08-09 NOTE — PROGRESS NOTES
I spent approximately 60 minutes with the patient acquiring the health and history intake and discussing topics related to healthy lifestyle. She is accompanied by her . Her LMP is approximately 5/18/21. She had an ultrasound on 7/16/21. She is currently 9 weeks and 3 days gestation. This is her 6th pregnancy. She had a miscarriage in December 2019. Her first 2 children wore born here at [facility] in [facility] and 2006. She had a fetal demise in August 2011 at 21 weeks gestation. The baby had Trisomy 18. In October 2012, she had a vaginal delivery at [facility] in [place]. She signed a release for us to get the records. Her oldest daughter was born with PDA and had surgery at age 14.   The patient has a history of anxiety. She filled out the depression screening questionnaire today. She has history of fibrocystic breast, exercise induced asthma, and chicken pox. She also has herpes and is on Valtrex. She denies any tobacco or drug use. She drank occasionally prior to pregnancy. Her last pap smear was negative on 12/7/18.   A newob bag is given. The 1st trimester teaching was done with the patient. We discussed a healthy diet and exercise and what is recommended. She is on a prenatal vitamin. She is a vegetarian. I informed patient not to be in hot tubs, saunas, or tanning beds. We discussed that spotting may occur after intercourse which is common, but if heavy bleeding like a period occurs to [facility] if clinic is closed.  I encouraged her to make an appointment with the dentist if she has not had a dental exam and cleaning in the last 6 months.  She plans to formula feed. I encouraged the patient to get the TDAP vaccine in the 3rd trimester.  I discussed with the patient that a pediatrician needs to be chosen prior to delivery for the infant to have an appointment scheduled before leaving the hospital. Her children see Dr. Galicia.  I discussed lab tests will be  done today. All questions were answered at this time. She has an appointment with Dr. Briceno today.

## 2021-08-10 PROBLEM — O09.521 MULTIGRAVIDA OF ADVANCED MATERNAL AGE IN FIRST TRIMESTER: Status: ACTIVE | Noted: 2021-08-10

## 2021-08-10 PROBLEM — O09.299 HISTORY OF POSTPARTUM HEMORRHAGE, CURRENTLY PREGNANT: Status: ACTIVE | Noted: 2021-08-10

## 2021-08-10 PROBLEM — O98.311 GENITAL HERPES AFFECTING PREGNANCY IN FIRST TRIMESTER: Status: ACTIVE | Noted: 2021-08-10

## 2021-08-10 PROBLEM — Z82.79 FAMILY HISTORY OF TRISOMY 18: Status: ACTIVE | Noted: 2021-08-10

## 2021-08-10 PROBLEM — A60.09 GENITAL HERPES AFFECTING PREGNANCY IN FIRST TRIMESTER: Status: ACTIVE | Noted: 2021-08-10

## 2021-08-10 LAB
BACTERIA SPEC AEROBE CULT: NO GROWTH
C TRACH RRNA CVX QL NAA+PROBE: NEGATIVE
N GONORRHOEA RRNA SPEC QL NAA+PROBE: NEGATIVE
RPR SER QL: NORMAL
RUBV IGG SERPL IA-ACNC: 2.82 INDEX
TRICHOMONAS VAGINALIS PCR: NEGATIVE

## 2021-08-10 NOTE — PROGRESS NOTES
[facility]  Obstetrics Visit    CHIEF COMPLAINT:  New prenatal visit    HISTORY OF PRESENT ILLNESS:  Clifton Macdonald is a 41 y.o. y/o  at 9w4d by LMP (Patient's last menstrual period was 2021 (approximate).).  This was a planned pregnancy and the patient is supported by her  and family.  Reports occasional nausea with rare vomiting.  Some reports breast tenderness.  She denies any vaginal bleeding.  She has   started taking a prenatal vitamin.    REVIEW OF SYSTEMS  Review of Systems    PRENATAL RISK FACTORS   Problems (from 21 to present)     Problem Noted Resolved    Family history of trisomy 18 8/10/2021 by Lorenzo Briceno MD No    Priority:  High      Overview Signed 8/10/2021  4:35 PM by Lorenzo Briceno MD     Hsd child trisomy 18.  Stillbirth diagnosed alpha-fetoprotein         History of postpartum hemorrhage, currently pregnant 8/10/2021 by Lorenzo Briceno MD No    Priority:  High      Overview Signed 8/10/2021  4:39 PM by Lorenzo Briceno MD     Required a uterine balloon after her last delivery         Genital herpes affecting pregnancy in first trimester 8/10/2021 by Lorenzo Briceno MD No    Priority:  High      Asthma  by Lorenzo Briceno MD No    Priority:  High      Overview Signed 8/10/2021  4:31 PM by Lorenzo Briceno MD     exercise induced asthma         Varicella  by Lorenzo Briceno MD No    Priority:  Low            DATING CRITERIA:     OBSTETRIC HISTORY:  OB History    Para Term  AB Living   6 4 3 1 1 3   SAB TAB Ectopic Molar Multiple Live Births   1 0 0 0 0 3      # Outcome Date GA Lbr Enrrique/2nd Weight Sex Delivery Anes PTL Lv   6 Current            5 SAB 2019           4 Term 10/26/12 39w4d  3118 g (6 lb 14 oz) F Vag-Spont  N TESSIE      Complications: Other Excessive Bleeding   3  2011 21w0d      N FD      Birth Comments: IOL for fetal demise. baby had Trisomy 18      Complications: Other Excessive Bleeding   2 Term  03/12/06   3799 g (8 lb 6 oz) M Vag-Spont  N TESSIE      Complications: Other Excessive Bleeding   1 Term 06/21/03 41w0d  3459 g (7 lb 10 oz) F Vag-Spont  N TESSIE     GYN HISTORY:  Denies h/o sexually transmitted infections/pelvic inflammatory disease  Denies h/o abnormal pap smears  Last pap smear:   Last Completed Pap Smear          PAP SMEAR (Every 3 Years) Next due on 12/7/2021 12/07/2018  Liquid-based Pap Smear, Screening    12/07/2018  HPV DNA Probe, Direct - ThinPrep Vial, Cervix    04/26/2006  Converted (Historical) Gyn Cytology    07/20/2005  Converted (Historical) Gyn Cytology    08/02/2004  Converted (Historical) Gyn Cytology    Only the first 5 history entries have been loaded, but more history exists.              Denies h/o gynecologic surgeries, including biopsies of the cervix    PAST MEDICAL HISTORY:  Past Medical History:   Diagnosis Date   • Anxiety    • Asthma     exercise induced asthma   • Fibrocystic breast    • Herpes    • Migraine    • Varicella      PAST SURGICAL HISTORY:  Past Surgical History:   Procedure Laterality Date   • BREAST AUGMENTATION     • CYSTECTOMY  2011    breast   • D & C WITH SUCTION     • DILATATION AND CURETTAGE     • OTHER SURGICAL HISTORY  10/2018    IVF   • VULVA SURGERY       FAMILY HISTORY:  Family History   Problem Relation Age of Onset   • No Known Problems Father    • Rheum arthritis Mother    • No Known Problems Brother    • No Known Problems Son    • No Known Problems Daughter    • Rheum arthritis Maternal Grandmother    • No Known Problems Daughter    • Breast cancer Maternal Aunt    • Breast cancer Maternal Aunt    • Breast cancer Maternal Aunt    • Uterine cancer Maternal Aunt      SOCIAL HISTORY:  Social History     Socioeconomic History   • Marital status:      Spouse name: Not on file   • Number of children: Not on file   • Years of education: Not on file   • Highest education level: Not on file   Tobacco Use   • Smoking status: Never Smoker   •  Smokeless tobacco: Never Used   Substance and Sexual Activity   • Alcohol use: Not Currently     Alcohol/week: 0.0 standard drinks     Comment: socially   • Drug use: No   • Sexual activity: Yes     Partners: Male     Comment: last pap smear 12/7/18 negative      GENETIC SCREENING:  Age >34 yo as of JASON: Patient over 35 it and a history of trisomy 18.  Wants cell free DNA.  Will turn 10 weeks in a few days.  She is going to get Invitae at the end of this week or early next  Thalassemia: Denies  NTD: Denies  CHD: Denies  Down Syndrome/MR/Fragile X/Autism: Denies  Ashkenazi Anglican with Ortega-Sachs, [place], familial dysautonomia: Denies  Sickle cell disease or trait: Denies  Hemophilia: Denies  Muscular dystrophy: Denies  Cystic fibrosis: Denies  [place]'s chorea: Denies  Birth defects: Denies  Genetic/chromosomal disorders: Prior child with trisomy 18 has seen Dr. Peng in the past has appointment with him for perinatology consult    INFECTION HISTORY:  TB exposure: Denies  HSV: Does have history of herpes takes Valtrex for outbreaks discussed suppression  Illness since LMP: Denies any  Prior GBS infected child: Denies any  STIs: Herpes denies other    ALLERGIES:  Allergies   Allergen Reactions   • Neosporin Original [Bacitracin-Neomycin-Polymyxin] Rash       MEDICATIONS:  Prior to Admission medications    Medication Sig Start Date End Date Taking? Authorizing Provider   diphenhydrAMINE (BENADRYL) 25 mg capsule Take 25 mg by mouth Every 6 (Six) Hours As Needed for Itching.   Yes Dottie Harrell MD   Prenatal Vit-Fe Fumarate-FA (PRENATAL VITAMIN 27-0.8) 27-0.8 MG tablet tablet Take  by mouth Daily.   Yes Dottie Harrell MD   valACYclovir (VALTREX) 500 MG tablet TAKE 1 TABLET BY MOUTH EVERY DAY FOR 30 DAYS 6/24/20  Yes Lorenzo Briceno MD       PHYSICAL EXAM:   LMP 05/18/2021 (Approximate)   General: Alert, healthy, no distress, well nourished and well developed.  Neurologic: Alert, oriented to person,  place, and time.  Gait normal.  Cranial nerves II-XII grossly intact.  HEENT: Normocephalic, atraumatic.  Extraocular muscles intact, pupils equal and reactive x2.    Teeth: Normal hygiene.  Neck: Supple, no adenopathy, thyroid normal size, non-tender, without nodularity, trachea midline.  Breasts: No masses, skin dimpling, skin retraction, nipple discharge, or asymmetry bilaterally.  Lungs: Normal respiratory effort.  Clear to auscultation bilaterally.  No wheezes, rhonci, or rales.  Heart: Regular rate and rhythm.  No murmer, rub or gallop.  Abdomen: Soft, non-tender, non-distended,no masses, no hepatosplenomegaly, no hernia.  Skin: No rash, no lesions.  Extremities: No cyanosis, clubbing or edema.  PELVIC EXAM:  External Genitalia/Vulva: Anatomy is normal, no significant redness of labia, no discharge on vulvar tissues, [place] [address]'s and [place]th[place]'s glands are normal, no ulcers, no condylomatous lesions.  Urethra: Normal, no lesions.  Vagina: Vaginal tissues are not inflamed, normal color and texture, no significant discharge present.  Cervix: Normal in appearance without lesions or purulent discharge, no cervical motion tenderness.  Uterus: Normal size, shape, and consistency.  Adnexa: Normal size and shape bilaterally, no palpable mass bilaterally and non-tender bilaterally.            IMPRESSION:  Clifton Macdonald is a 41 y.o.  at 9w4d for a new prenatal visit.    PLAN:  1.  IUP at 9w3d  - Options counseling performed and patient desires continuation of pregnancy to term   - Prenatal labs ordered  - Genetic testing, including cystic fibrosis, was discussed and patient at length both diagnostic testing and its advantages over screening testing but they want to start out with cell free DNA they have an appointment with Dr. Ocasio for further evaluation  - Continue  prenatal vitamins  - Weight gain counseling performed.   - Pregravid BMI 25-29.9: Recommend 15-25 lb  - Return to clinic in 4 weeks for return prenatal  visit  - Reviewed COVID-19 visitation policy  - Reviewed COVID-19 precautions     Diagnosis Plan   1. Mild intermittent asthma without complication     2. Varicella without complication     3. Family history of trisomy 18   and over 35.  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  Stillbirth diagnosed alpha-fetoprotein   4. 9 weeks gestation of pregnancy     5. History of postpartum hemorrhage, currently pregnant   have red kit available at delivery    Required a uterine balloon after her last delivery   6. Genital herpes affecting pregnancy in first trimester   suppression at 36 weeks     Lorenzo Briceno MD  8/10/2021  16:41 CDT

## 2021-08-16 ENCOUNTER — LAB (OUTPATIENT)
Dept: LAB | Facility: HOSPITAL | Age: 41
End: 2021-08-16

## 2021-08-16 DIAGNOSIS — O09.299 TRISOMY 18 IN CHILD OF PRIOR PREGNANCY, CURRENTLY PREGNANT: Primary | ICD-10-CM

## 2021-08-23 ENCOUNTER — ROUTINE PRENATAL (OUTPATIENT)
Dept: OBSTETRICS AND GYNECOLOGY | Facility: CLINIC | Age: 41
End: 2021-08-23

## 2021-08-23 VITALS — WEIGHT: 168.8 LBS | BODY MASS INDEX: 25.67 KG/M2 | DIASTOLIC BLOOD PRESSURE: 70 MMHG | SYSTOLIC BLOOD PRESSURE: 116 MMHG

## 2021-08-23 DIAGNOSIS — O09.521 MULTIGRAVIDA OF ADVANCED MATERNAL AGE IN FIRST TRIMESTER: ICD-10-CM

## 2021-08-23 DIAGNOSIS — Z3A.11 11 WEEKS GESTATION OF PREGNANCY: Primary | ICD-10-CM

## 2021-08-23 DIAGNOSIS — O09.299 HISTORY OF POSTPARTUM HEMORRHAGE, CURRENTLY PREGNANT: ICD-10-CM

## 2021-08-23 DIAGNOSIS — Z82.79 FAMILY HISTORY OF TRISOMY 18: ICD-10-CM

## 2021-08-23 DIAGNOSIS — J45.20 MILD INTERMITTENT ASTHMA WITHOUT COMPLICATION: ICD-10-CM

## 2021-08-23 DIAGNOSIS — B01.9 VARICELLA WITHOUT COMPLICATION: ICD-10-CM

## 2021-08-23 DIAGNOSIS — A60.09 GENITAL HERPES AFFECTING PREGNANCY IN FIRST TRIMESTER: ICD-10-CM

## 2021-08-23 DIAGNOSIS — O98.311 GENITAL HERPES AFFECTING PREGNANCY IN FIRST TRIMESTER: ICD-10-CM

## 2021-08-23 PROCEDURE — 0502F SUBSEQUENT PRENATAL CARE: CPT | Performed by: OBSTETRICS & GYNECOLOGY

## 2021-08-23 NOTE — PROGRESS NOTES
CC: Prenatal visit    Clifton Macdonald is a 41 y.o.  at 11w3d.  Doing well.  Denies contractions, LOF, or VB.  Reports good FM.    /70   Wt 76.6 kg (168 lb 12.8 oz)   LMP 2021 (Approximate)   BMI 25.67 kg/m²              Problems (from 21 to present)     Problem Noted Resolved    Family history of trisomy 18 8/10/2021 by Lorenzo Briceno MD No    Priority:  High      Overview Signed 8/10/2021  4:35 PM by Lorenzo Briceno MD     Hsd child trisomy 18.  Stillbirth diagnosed alpha-fetoprotein         History of postpartum hemorrhage, currently pregnant 8/10/2021 by Lorenzo Briceno MD No    Priority:  High      Overview Signed 8/10/2021  4:39 PM by Lorenzo Briceno MD     Required a uterine balloon after her last delivery         Genital herpes affecting pregnancy in first trimester 8/10/2021 by Lorenzo Briceno MD No    Priority:  High      Multigravida of advanced maternal age in first trimester 8/10/2021 by Lorenzo Briceno MD No    Priority:  High      Overview Signed 8/10/2021  4:49 PM by Lorenzo Briceno MD     Wants to get cell free DNA this is arranged to get as soon as she turns 10 weeks         Asthma  by Lorenzo Briceno MD No    Priority:  High      Overview Signed 8/10/2021  4:31 PM by Lorenzo Briceno MD     exercise induced asthma         Varicella  by Lorenzo Briceno MD No    Priority:  Low            A/P: Clifton Macdonald is a 41 y.o.  at 11w3d.  - RTC in 4 weeks  - Reviewed COVID-19 visitation policy  - Reviewed COVID-19 precautions     Diagnosis Plan   1. 11 weeks gestation of pregnancy     2. Family history of trisomy 18     3. History of postpartum hemorrhage, currently pregnant     4. Genital herpes affecting pregnancy in first trimester     5. Multigravida of advanced maternal age in first trimester   Invitae sample drawn and sent to [employer] pending   6. Mild intermittent asthma without complication     7. Varicella without complication       Lorenzo Briceno  MD  8/23/2021  13:31 CDT

## 2021-08-30 ENCOUNTER — TELEPHONE (OUTPATIENT)
Dept: OBSTETRICS AND GYNECOLOGY | Facility: CLINIC | Age: 41
End: 2021-08-30

## 2021-08-30 NOTE — TELEPHONE ENCOUNTER
Patient is in DKA results have returned and they are negative.  This is reviewed with her along with limitations.  They took quite a bit of time to come back.  I called the company and ask.  Apparently the original sample the entire batch did not go through and then it was really run and it went through in the normal time and was negative.  I reviewed this with the patient patient understand limitations of the cell free DNA.  She has fetomaternal consultation with Dr. Carrillo who she has seen in the past on Wednesday

## 2021-09-01 DIAGNOSIS — O09.299 TRISOMY 18 IN CHILD OF PRIOR PREGNANCY, CURRENTLY PREGNANT: ICD-10-CM

## 2021-09-02 RX ORDER — VALACYCLOVIR HYDROCHLORIDE 500 MG/1
TABLET, FILM COATED ORAL
Qty: 30 TABLET | Refills: 5 | Status: SHIPPED | OUTPATIENT
Start: 2021-09-02 | End: 2022-01-24 | Stop reason: SDUPTHER

## 2021-09-03 DIAGNOSIS — Z78.9 DATE OF LAST MENSTRUAL PERIOD (LMP) UNKNOWN: ICD-10-CM

## 2021-09-10 DIAGNOSIS — O09.521 MULTIGRAVIDA OF ADVANCED MATERNAL AGE IN FIRST TRIMESTER: Primary | ICD-10-CM

## 2021-09-20 ENCOUNTER — LAB (OUTPATIENT)
Dept: LAB | Facility: HOSPITAL | Age: 41
End: 2021-09-20

## 2021-09-20 ENCOUNTER — ROUTINE PRENATAL (OUTPATIENT)
Dept: OBSTETRICS AND GYNECOLOGY | Facility: CLINIC | Age: 41
End: 2021-09-20

## 2021-09-20 VITALS — WEIGHT: 174.6 LBS | BODY MASS INDEX: 26.55 KG/M2 | SYSTOLIC BLOOD PRESSURE: 122 MMHG | DIASTOLIC BLOOD PRESSURE: 72 MMHG

## 2021-09-20 DIAGNOSIS — B01.9 VARICELLA WITHOUT COMPLICATION: ICD-10-CM

## 2021-09-20 DIAGNOSIS — O09.299 HISTORY OF POSTPARTUM HEMORRHAGE, CURRENTLY PREGNANT: ICD-10-CM

## 2021-09-20 DIAGNOSIS — O98.311 GENITAL HERPES AFFECTING PREGNANCY IN FIRST TRIMESTER: ICD-10-CM

## 2021-09-20 DIAGNOSIS — O09.521 MULTIGRAVIDA OF ADVANCED MATERNAL AGE IN FIRST TRIMESTER: ICD-10-CM

## 2021-09-20 DIAGNOSIS — Z3A.15 15 WEEKS GESTATION OF PREGNANCY: ICD-10-CM

## 2021-09-20 DIAGNOSIS — A60.09 GENITAL HERPES AFFECTING PREGNANCY IN FIRST TRIMESTER: ICD-10-CM

## 2021-09-20 DIAGNOSIS — Z91.89 AT RISK FOR GESTATIONAL DIABETES MELLITUS: Primary | ICD-10-CM

## 2021-09-20 DIAGNOSIS — J45.20 MILD INTERMITTENT ASTHMA WITHOUT COMPLICATION: ICD-10-CM

## 2021-09-20 DIAGNOSIS — Z82.79 FAMILY HISTORY OF TRISOMY 18: ICD-10-CM

## 2021-09-20 LAB — GLUCOSE 1H P 100 G GLC PO SERPL-MCNC: 103 MG/DL (ref 65–139)

## 2021-09-20 PROCEDURE — 36415 COLL VENOUS BLD VENIPUNCTURE: CPT

## 2021-09-20 PROCEDURE — 82950 GLUCOSE TEST: CPT

## 2021-09-20 PROCEDURE — 0502F SUBSEQUENT PRENATAL CARE: CPT | Performed by: OBSTETRICS & GYNECOLOGY

## 2021-09-20 NOTE — PROGRESS NOTES
CC: Prenatal visit    Clifton Macdonald is a 41 y.o.  at 15w3d.  Doing well.  Denies contractions, LOF, or VB.  Reports good FM.    /72   Wt 79.2 kg (174 lb 9.6 oz)   LMP 2021 (Approximate)   BMI 26.55 kg/m²        Fetal Heart Rate: 160     Problems (from 21 to present)     Problem Noted Resolved    Family history of trisomy 18 8/10/2021 by Lorenzo Briceno MD No    Priority:  High      Overview Signed 8/10/2021  4:35 PM by Lorenzo Briceno MD     Hsd child trisomy 18.  Stillbirth diagnosed alpha-fetoprotein         History of postpartum hemorrhage, currently pregnant 8/10/2021 by Lorenzo Briceno MD No    Priority:  High      Overview Signed 8/10/2021  4:39 PM by Lorenzo Briceno MD     Required a uterine balloon after her last delivery         Genital herpes affecting pregnancy in first trimester 8/10/2021 by Lorenzo Briceno MD No    Priority:  High      Multigravida of advanced maternal age in first trimester 8/10/2021 by Lorenzo Briceno MD No    Priority:  High      Overview Signed 8/10/2021  4:49 PM by Lorenzo Briceno MD     Wants to get cell free DNA this is arranged to get as soon as she turns 10 weeks         Asthma  by Lorenzo Briceno MD No    Priority:  High      Overview Signed 8/10/2021  4:31 PM by Lorenzo Briceno MD     exercise induced asthma         Varicella  by Lorenzo Briceno MD No    Priority:  Low            A/P: Clifton Macdonald is a 41 y.o.  at 15w3d.  - RTC in 2 weeks  - Reviewed COVID-19 visitation policy  - Reviewed COVID-19 precautions     Diagnosis Plan   1. At risk for gestational diabetes mellitus   getting Glucola today will let her know results   2. Family history of trisomy 18   Invitae has been reviewed with patient   3. History of postpartum hemorrhage, currently pregnant     4. Genital herpes affecting pregnancy in first trimester     5. Multigravida of advanced maternal age in first trimester     6. Mild intermittent asthma without  complication     7. Varicella without complication     8. 15 weeks gestation of pregnancy       Lorenzo Briceno MD  9/20/2021  16:19 CDT

## 2021-10-20 ENCOUNTER — ROUTINE PRENATAL (OUTPATIENT)
Dept: OBSTETRICS AND GYNECOLOGY | Facility: CLINIC | Age: 41
End: 2021-10-20

## 2021-10-20 VITALS — WEIGHT: 179.4 LBS | DIASTOLIC BLOOD PRESSURE: 68 MMHG | SYSTOLIC BLOOD PRESSURE: 110 MMHG | BODY MASS INDEX: 27.28 KG/M2

## 2021-10-20 DIAGNOSIS — O09.521 MULTIGRAVIDA OF ADVANCED MATERNAL AGE IN FIRST TRIMESTER: ICD-10-CM

## 2021-10-20 DIAGNOSIS — O98.311 GENITAL HERPES AFFECTING PREGNANCY IN FIRST TRIMESTER: ICD-10-CM

## 2021-10-20 DIAGNOSIS — Z82.79 FAMILY HISTORY OF TRISOMY 18: ICD-10-CM

## 2021-10-20 DIAGNOSIS — J45.20 MILD INTERMITTENT ASTHMA WITHOUT COMPLICATION: ICD-10-CM

## 2021-10-20 DIAGNOSIS — O09.299 HISTORY OF POSTPARTUM HEMORRHAGE, CURRENTLY PREGNANT: ICD-10-CM

## 2021-10-20 DIAGNOSIS — A60.09 GENITAL HERPES AFFECTING PREGNANCY IN FIRST TRIMESTER: ICD-10-CM

## 2021-10-20 DIAGNOSIS — B01.9 VARICELLA WITHOUT COMPLICATION: ICD-10-CM

## 2021-10-20 PROCEDURE — 0502F SUBSEQUENT PRENATAL CARE: CPT | Performed by: OBSTETRICS & GYNECOLOGY

## 2021-10-20 RX ORDER — ASPIRIN 81 MG/1
81 TABLET, CHEWABLE ORAL DAILY
COMMUNITY

## 2021-10-20 NOTE — PROGRESS NOTES
CC: Prenatal visit    Clifton Macdonald is a 41 y.o.  at 19w5d.  Doing well.  Denies contractions, LOF, or VB.  Reports good FM.    /68   Wt 81.4 kg (179 lb 6.4 oz)   LMP 2021 (Approximate)   BMI 27.28 kg/m²   SVE:            Problems (from 21 to present)     Problem Noted Resolved    Family history of trisomy 18 8/10/2021 by Lorenzo Briceno MD No    Priority:  High      Overview Signed 8/10/2021  4:35 PM by Lorenzo Briceno MD     Hsd child trisomy 18.  Stillbirth diagnosed alpha-fetoprotein         History of postpartum hemorrhage, currently pregnant 8/10/2021 by Lorenzo Briceno MD No    Priority:  High      Overview Addendum 10/20/2021  9:25 AM by Lorenzo Briceno MD     Required a uterine balloon after her last delivery  MFM recommended Methergine and Hemabate at delivery prophylactically would also use TXA         Previous Version    Genital herpes affecting pregnancy in first trimester 8/10/2021 by Lorenzo Briceno MD No    Priority:  High      Multigravida of advanced maternal age in first trimester 8/10/2021 by Lorenzo Briceno MD No    Priority:  High      Overview Addendum 10/20/2021  9:27 AM by Lorenzo Briceno MD     Wants to get cell free DNA this is arranged to get as soon as she turns 10 weeks  Recommendation for testing at 36 weeks weekly         Previous Version    Asthma  by Lorenzo Briceno MD No    Priority:  High      Overview Signed 8/10/2021  4:31 PM by Lorenzo Briceno MD     exercise induced asthma         Varicella  by Lorenzo Briceno MD No    Priority:  Low            A/P: Clifton Macdonald is a 41 y.o.  at 19w5d.  - RTC in 4 weeks  - Reviewed COVID-19 visitation policy  - Reviewed COVID-19 precautions     Diagnosis Plan   1. Family history of trisomy 18     2. History of postpartum hemorrhage, currently pregnant     3. Genital herpes affecting pregnancy in first trimester     4. Multigravida of advanced maternal age in first trimester     5. Mild  intermittent asthma without complication     6. Varicella without complication       Lorenzo Briceno MD  10/20/2021  09:28 CDT

## 2021-11-04 ENCOUNTER — TELEPHONE (OUTPATIENT)
Dept: OBSTETRICS AND GYNECOLOGY | Facility: CLINIC | Age: 41
End: 2021-11-04

## 2021-11-04 NOTE — TELEPHONE ENCOUNTER
PATIENT CALLED AND LEFT VM THAT SHE WANTED TO DAYANA HER APPT FROM 12/15/21 TO 12/16/21  RESCHEDULED THE PT AND LEFT HER VM OF NEW APPT DAY AND TIME 12/16/21@830AM W/DR SIFUENTES

## 2021-11-17 ENCOUNTER — OFFICE VISIT (OUTPATIENT)
Dept: OBSTETRICS AND GYNECOLOGY | Facility: CLINIC | Age: 41
End: 2021-11-17

## 2021-11-17 DIAGNOSIS — O98.311 GENITAL HERPES AFFECTING PREGNANCY IN FIRST TRIMESTER: ICD-10-CM

## 2021-11-17 DIAGNOSIS — Z3A.25 25 WEEKS GESTATION OF PREGNANCY: ICD-10-CM

## 2021-11-17 DIAGNOSIS — O09.299 HISTORY OF POSTPARTUM HEMORRHAGE, CURRENTLY PREGNANT: ICD-10-CM

## 2021-11-17 DIAGNOSIS — J45.20 MILD INTERMITTENT ASTHMA WITHOUT COMPLICATION: ICD-10-CM

## 2021-11-17 DIAGNOSIS — Z82.79 FAMILY HISTORY OF TRISOMY 18: ICD-10-CM

## 2021-11-17 DIAGNOSIS — O09.521 MULTIGRAVIDA OF ADVANCED MATERNAL AGE IN FIRST TRIMESTER: Primary | ICD-10-CM

## 2021-11-17 DIAGNOSIS — A60.09 GENITAL HERPES AFFECTING PREGNANCY IN FIRST TRIMESTER: ICD-10-CM

## 2021-11-17 PROCEDURE — 0502F SUBSEQUENT PRENATAL CARE: CPT | Performed by: OBSTETRICS & GYNECOLOGY

## 2021-11-18 NOTE — PROGRESS NOTES
You have chosen to receive care through a telephone visit. Do you consent to use a telephone visit for your medical care today? Yes  This visit has been rescheduled as a phone visit to comply with patient safety concerns in accordance with CDC recommendations. Total time of discussion was 12 minutes    Chief complaint return OB    Patient is doing well she said without complaints because a professional commitments was not able to make in person visit today.  Denies contractions rupture membranes vaginal bleeding is headaches visual changes epigastric pain says baby is active.  Discussed plans for delivery Dr. Ocasio's recommendations.  We will plan to see in a couple of weeks.

## 2021-12-16 ENCOUNTER — LAB (OUTPATIENT)
Dept: LAB | Facility: HOSPITAL | Age: 41
End: 2021-12-16

## 2021-12-16 ENCOUNTER — ROUTINE PRENATAL (OUTPATIENT)
Dept: OBSTETRICS AND GYNECOLOGY | Facility: CLINIC | Age: 41
End: 2021-12-16

## 2021-12-16 VITALS — SYSTOLIC BLOOD PRESSURE: 110 MMHG | DIASTOLIC BLOOD PRESSURE: 72 MMHG

## 2021-12-16 DIAGNOSIS — O09.521 MULTIGRAVIDA OF ADVANCED MATERNAL AGE IN FIRST TRIMESTER: ICD-10-CM

## 2021-12-16 DIAGNOSIS — O98.311 GENITAL HERPES AFFECTING PREGNANCY IN FIRST TRIMESTER: ICD-10-CM

## 2021-12-16 DIAGNOSIS — O09.299 HISTORY OF POSTPARTUM HEMORRHAGE, CURRENTLY PREGNANT: ICD-10-CM

## 2021-12-16 DIAGNOSIS — J45.20 MILD INTERMITTENT ASTHMA WITHOUT COMPLICATION: ICD-10-CM

## 2021-12-16 DIAGNOSIS — B01.9 VARICELLA WITHOUT COMPLICATION: ICD-10-CM

## 2021-12-16 DIAGNOSIS — Z82.79 FAMILY HISTORY OF TRISOMY 18: ICD-10-CM

## 2021-12-16 DIAGNOSIS — O09.523 MULTIGRAVIDA OF ADVANCED MATERNAL AGE IN THIRD TRIMESTER: Primary | ICD-10-CM

## 2021-12-16 DIAGNOSIS — A60.09 GENITAL HERPES AFFECTING PREGNANCY IN FIRST TRIMESTER: ICD-10-CM

## 2021-12-16 LAB — GLUCOSE 1H P 100 G GLC PO SERPL-MCNC: 154 MG/DL (ref 65–139)

## 2021-12-16 PROCEDURE — 36415 COLL VENOUS BLD VENIPUNCTURE: CPT

## 2021-12-16 PROCEDURE — 0502F SUBSEQUENT PRENATAL CARE: CPT | Performed by: OBSTETRICS & GYNECOLOGY

## 2021-12-16 PROCEDURE — 82950 GLUCOSE TEST: CPT

## 2021-12-16 RX ORDER — ERGOCALCIFEROL (VITAMIN D2) 10 MCG
400 TABLET ORAL DAILY
COMMUNITY

## 2021-12-16 RX ORDER — CYANOCOBALAMIN, ISOPROPYL ALCOHOL 1000MCG/ML
KIT INJECTION
COMMUNITY

## 2021-12-17 DIAGNOSIS — O99.810 ABNORMAL MATERNAL GLUCOSE TOLERANCE, COMPLICATING PREGNANCY: Primary | ICD-10-CM

## 2021-12-17 NOTE — PROGRESS NOTES
CC: Prenatal visit    Clifton Macdonald is a 41 y.o.  at 29w1d.  Doing well.  Denies contractions, LOF, or VB.  Reports good FM.    /72   LMP 2021 (Approximate)   S           Problems (from 21 to present)     Problem Noted Resolved    Family history of trisomy 18 8/10/2021 by Lorenzo Briceno MD No    Priority:  High      Overview Signed 8/10/2021  4:35 PM by Lorenzo Briceno MD     Hsd child trisomy 18.  Stillbirth diagnosed alpha-fetoprotein         History of postpartum hemorrhage, currently pregnant 8/10/2021 by Lorenzo Briceno MD No    Priority:  High      Overview Addendum 10/20/2021  9:25 AM by Lorenzo Briceno MD     Required a uterine balloon after her last delivery  MFM recommended Methergine and Hemabate at delivery prophylactically would also use TXA         Previous Version    Genital herpes affecting pregnancy in first trimester 8/10/2021 by Lorenzo Briceno MD No    Priority:  High      Multigravida of advanced maternal age in first trimester 8/10/2021 by Lorenzo Briceno MD No    Priority:  High      Overview Addendum 10/20/2021  9:27 AM by Lorenzo Briceno MD     Wants to get cell free DNA this is arranged to get as soon as she turns 10 weeks  Recommendation for testing at 36 weeks weekly         Previous Version    Asthma  by Lorenzo Briceno MD No    Priority:  High      Overview Signed 8/10/2021  4:31 PM by Lorenzo Briceno MD     exercise induced asthma         Varicella  by Lorenzo Briceno MD No    Priority:  Low            A/P: Clifton Macdonald is a 41 y.o.  at 29w1d.  - RTC in 2 weeks  - Reviewed COVID-19 visitation policy  - Reviewed COVID-19 precautions     Diagnosis Plan   1. Multigravida of advanced maternal age in third trimester  US Fetal Biophysical Profile;Without Non-Stress Testing after reviewing risk benefits alternatives will begin fetal wellbeing testing for advanced maternal age at 32 weeks   2. Family history of trisomy 18     3. History of  postpartum hemorrhage, currently pregnant     4. Genital herpes affecting pregnancy in first trimester   already on suppression   5. Multigravida of advanced maternal age in first trimester     6. Mild intermittent asthma without complication     7. Varicella without complication       Lorenzo Briceno MD  12/16/2021  20:57 CST

## 2021-12-27 ENCOUNTER — ROUTINE PRENATAL (OUTPATIENT)
Dept: OBSTETRICS AND GYNECOLOGY | Facility: CLINIC | Age: 41
End: 2021-12-27

## 2021-12-27 DIAGNOSIS — A60.09 GENITAL HERPES AFFECTING PREGNANCY IN THIRD TRIMESTER: ICD-10-CM

## 2021-12-27 DIAGNOSIS — O98.313 GENITAL HERPES AFFECTING PREGNANCY IN THIRD TRIMESTER: ICD-10-CM

## 2021-12-27 DIAGNOSIS — O09.299 HISTORY OF POSTPARTUM HEMORRHAGE, CURRENTLY PREGNANT: ICD-10-CM

## 2021-12-27 DIAGNOSIS — O47.00 PRETERM UTERINE CONTRACTIONS, ANTEPARTUM: ICD-10-CM

## 2021-12-27 DIAGNOSIS — J45.20 MILD INTERMITTENT ASTHMA WITHOUT COMPLICATION: ICD-10-CM

## 2021-12-27 DIAGNOSIS — O09.521 MULTIGRAVIDA OF ADVANCED MATERNAL AGE IN FIRST TRIMESTER: ICD-10-CM

## 2021-12-27 DIAGNOSIS — Z3A.30 30 WEEKS GESTATION OF PREGNANCY: Primary | ICD-10-CM

## 2021-12-27 DIAGNOSIS — Z82.79 FAMILY HISTORY OF TRISOMY 18: ICD-10-CM

## 2021-12-27 LAB
BLOODY SPECIMEN?: NO
FIBRONECTIN FETAL VAG QL: NEGATIVE

## 2021-12-27 PROCEDURE — 0502F SUBSEQUENT PRENATAL CARE: CPT | Performed by: OBSTETRICS & GYNECOLOGY

## 2021-12-27 PROCEDURE — 82731 ASSAY OF FETAL FIBRONECTIN: CPT | Performed by: OBSTETRICS & GYNECOLOGY

## 2021-12-28 ENCOUNTER — TELEPHONE (OUTPATIENT)
Dept: OBSTETRICS AND GYNECOLOGY | Facility: CLINIC | Age: 41
End: 2021-12-28

## 2021-12-28 VITALS — DIASTOLIC BLOOD PRESSURE: 84 MMHG | BODY MASS INDEX: 29.65 KG/M2 | SYSTOLIC BLOOD PRESSURE: 124 MMHG | WEIGHT: 195 LBS

## 2021-12-28 PROBLEM — O98.311 GENITAL HERPES AFFECTING PREGNANCY IN FIRST TRIMESTER: Status: RESOLVED | Noted: 2021-08-10 | Resolved: 2021-12-28

## 2021-12-28 PROBLEM — A60.09 GENITAL HERPES AFFECTING PREGNANCY IN FIRST TRIMESTER: Status: RESOLVED | Noted: 2021-08-10 | Resolved: 2021-12-28

## 2021-12-28 PROBLEM — A60.09 GENITAL HERPES AFFECTING PREGNANCY IN THIRD TRIMESTER: Status: ACTIVE | Noted: 2021-12-28

## 2021-12-28 PROBLEM — O98.313 GENITAL HERPES AFFECTING PREGNANCY IN THIRD TRIMESTER: Status: ACTIVE | Noted: 2021-12-28

## 2021-12-28 NOTE — TELEPHONE ENCOUNTER
Patient decided she wants to do her 3 hr glucose here. She had Dr. Briceno send her orders to the [facility] where she lives but didn't realize she had to stay there for 3 hours. She has an upcoming appt 01/06/22 and will have it done here then  Can you please put in lab orders

## 2021-12-28 NOTE — PROGRESS NOTES
CC: Prenatal visit    Clifton Macdonald is a 41 y.o.  at 30w6d.  Doing well.  Denies contractions, LOF, or VB.  Reports good FM.    /84   Wt 88.5 kg (195 lb)   LMP 2021 (Approximate)   Breastfeeding Unknown   BMI 29.65 kg/m²      Fundal Height (cm): 31 cm  Fetal Heart Rate: 150     Problems (from 21 to 21)     Problem Noted Resolved    Family history of trisomy 18 8/10/2021 by Lorenzo Briceno MD No    Priority:  High      Overview Signed 8/10/2021  4:35 PM by Lorenzo Briceno MD     Hsd child trisomy 18.  Stillbirth diagnosed alpha-fetoprotein         History of postpartum hemorrhage, currently pregnant 8/10/2021 by Lorenzo Briceno MD No    Priority:  High      Overview Addendum 10/20/2021  9:25 AM by Lorenzo Briceno MD     Required a uterine balloon after her last delivery  MFM recommended Methergine and Hemabate at delivery prophylactically would also use TXA         Previous Version    Multigravida of advanced maternal age in first trimester 8/10/2021 by Lorenzo Briceno MD No    Priority:  High      Overview Addendum 10/20/2021  9:27 AM by Lorenzo Briceno MD     Wants to get cell free DNA this is arranged to get as soon as she turns 10 weeks  Recommendation for testing at 36 weeks weekly         Previous Version    Asthma  by Lorenzo Briceno MD No    Priority:  High      Overview Signed 8/10/2021  4:31 PM by Lorenzo Briceno MD     exercise induced asthma         Varicella  by Lorenzo Briceno MD No    Priority:  Low      Genital herpes affecting pregnancy in first trimester 8/10/2021 by Lorenzo Briceno MD 2021 by Lorenzo Briceno MD    Priority:  High            A/P: Clifton Macdonald is a 41 y.o.  at 30w6d.  - RTC in ` weeks  - Reviewed COVID-19 visitation policy  - Reviewed COVID-19 precautions     Diagnosis Plan   1. 30 weeks gestation of pregnancy     2.  uterine contractions, antepartum  Fetal Fibronectin - Vaginal Fluid, Cervix   3. Mild  intermittent asthma without complication     4. Family history of trisomy 18     5. Genital herpes affecting pregnancy in third trimester     6. History of postpartum hemorrhage, currently pregnant     7. Multigravida of advanced maternal age in first trimester     Patient called having some back pain really right sacral pain by history musculoskeletal skeletal in nature but I want to come and evaluate her.  Cervix closed today nothing in vagina for 24 hours with did before examination of fetal fibronectin will call her with results if this is negative we will give good reassurance of unlikely  delivery for 24 hours.  Baby is breech today I discussed implications of this if she does go into  labor  Lorenzo Briceno MD  2021  13:53 CST

## 2022-01-03 ENCOUNTER — TELEPHONE (OUTPATIENT)
Dept: OBSTETRICS AND GYNECOLOGY | Facility: CLINIC | Age: 42
End: 2022-01-03

## 2022-01-03 NOTE — TELEPHONE ENCOUNTER
PATIENT CALLED WANTING TO KNOW IF SHE COULD DO HER 3 HOUR HERE. ONE OF THE [facility] STAFF TOLD HER THAT SHE COULD BUT SHE KNEW THAT I HAD TOLD HER THAT SHE NEEDED AN APPOINTMENT. I CALLED HER BACK AND LET HER KNOW THAT YES SHE NEEDED AN APPOINTMENT. I TRIED TO GET HER IN ON HER APPOINTMENT DATE WITH US BUT COULDN'T SO I CALLED HER AND LEFT HER A MESSAGE WITH THE LAB NUMBER FOR HER TO CALL.

## 2022-01-06 ENCOUNTER — ROUTINE PRENATAL (OUTPATIENT)
Dept: OBSTETRICS AND GYNECOLOGY | Facility: CLINIC | Age: 42
End: 2022-01-06

## 2022-01-06 VITALS — BODY MASS INDEX: 30.32 KG/M2 | DIASTOLIC BLOOD PRESSURE: 70 MMHG | SYSTOLIC BLOOD PRESSURE: 118 MMHG | WEIGHT: 199.4 LBS

## 2022-01-06 DIAGNOSIS — B01.9 VARICELLA WITHOUT COMPLICATION: ICD-10-CM

## 2022-01-06 DIAGNOSIS — O09.299 HISTORY OF POSTPARTUM HEMORRHAGE, CURRENTLY PREGNANT: ICD-10-CM

## 2022-01-06 DIAGNOSIS — Z3A.32 32 WEEKS GESTATION OF PREGNANCY: ICD-10-CM

## 2022-01-06 DIAGNOSIS — O09.521 MULTIGRAVIDA OF ADVANCED MATERNAL AGE IN FIRST TRIMESTER: Primary | ICD-10-CM

## 2022-01-06 DIAGNOSIS — Z23 NEED FOR DIPHTHERIA-TETANUS-PERTUSSIS (TDAP) VACCINE: ICD-10-CM

## 2022-01-06 DIAGNOSIS — Z82.79 FAMILY HISTORY OF TRISOMY 18: ICD-10-CM

## 2022-01-06 DIAGNOSIS — J45.20 MILD INTERMITTENT ASTHMA WITHOUT COMPLICATION: ICD-10-CM

## 2022-01-06 PROCEDURE — 90471 IMMUNIZATION ADMIN: CPT | Performed by: OBSTETRICS & GYNECOLOGY

## 2022-01-06 PROCEDURE — 90715 TDAP VACCINE 7 YRS/> IM: CPT | Performed by: OBSTETRICS & GYNECOLOGY

## 2022-01-06 PROCEDURE — 0502F SUBSEQUENT PRENATAL CARE: CPT | Performed by: OBSTETRICS & GYNECOLOGY

## 2022-01-06 NOTE — PROGRESS NOTES
CC: Prenatal visit     Clifton Macdonald is a 41 y.o.  at 32w1d.  Doing well.  Denies contractions, LOF, or VB.  Reports good FM.    /70   Wt 90.4 kg (199 lb 6.4 oz)   LMP 2021 (Approximate)   BMI 30.32 kg/m²            Problems (from 21 to present)     Problem Noted Resolved    Family history of trisomy 18 8/10/2021 by Lorenzo Briceno MD No    Priority:  High      Overview Signed 8/10/2021  4:35 PM by Lorenzo Briceno MD     Hsd child trisomy 18.  Stillbirth diagnosed alpha-fetoprotein         History of postpartum hemorrhage, currently pregnant 8/10/2021 by Lorenzo Briceno MD No    Priority:  High      Overview Addendum 10/20/2021  9:25 AM by Lorenzo Briceno MD     Required a uterine balloon after her last delivery  MFM recommended Methergine and Hemabate at delivery prophylactically would also use TXA         Previous Version    Multigravida of advanced maternal age in first trimester 8/10/2021 by Lorenzo Briceno MD No    Priority:  High      Overview Addendum 10/20/2021  9:27 AM by Lorenzo Briceno MD     Wants to get cell free DNA this is arranged to get as soon as she turns 10 weeks  Recommendation for testing at 36 weeks weekly         Previous Version    Asthma  by Lorenzo Briceno MD No    Priority:  High      Overview Signed 8/10/2021  4:31 PM by Lorenzo Briceno MD     exercise induced asthma         Varicella  by Lorenzo Briceno MD No    Priority:  Low      Genital herpes affecting pregnancy in first trimester 8/10/2021 by Lorenzo Briceno MD 2021 by Lorenzo Briceno MD    Priority:  High            A/P: Clifton Macdonald is a 41 y.o.  at 32w1d.  - RTC in 1 weeks  - Reviewed COVID-19 visitation policy  - Reviewed COVID-19 precautions     Diagnosis Plan   1. Multigravida of advanced maternal age in first trimester  US Fetal Biophysical Profile;Without Non-Stress Testing   Discussed risk benefits alternatives to testing for her advanced maternal US Fetal  Biophysical Profile;Without Non-Stress Testing    US Fetal Biophysical Profile;Without Non-Stress Testing    US Fetal Biophysical Profile;Without Non-Stress Testing    US Fetal Biophysical Profile;Without Non-Stress Testing    US Fetal Biophysical Profile;Without Non-Stress Testing    US Fetal Biophysical Profile;Without Non-Stress Testing   2. Family history of trisomy 18     3. History of postpartum hemorrhage, currently pregnant     4. Mild intermittent asthma without complication     5. Varicella without complication     6. Need for diphtheria-tetanus-pertussis (Tdap) vaccine  Tdap Vaccine Greater Than or Equal To 6yo IM   7. 32 weeks gestation of pregnancy       Lorenzo Briceno MD  1/6/2022  17:02 CST

## 2022-01-12 DIAGNOSIS — O99.810 ABNORMAL MATERNAL GLUCOSE TOLERANCE, COMPLICATING PREGNANCY: ICD-10-CM

## 2022-01-13 ENCOUNTER — TELEPHONE (OUTPATIENT)
Dept: OBSTETRICS AND GYNECOLOGY | Facility: CLINIC | Age: 42
End: 2022-01-13

## 2022-01-13 ENCOUNTER — ROUTINE PRENATAL (OUTPATIENT)
Dept: OBSTETRICS AND GYNECOLOGY | Facility: CLINIC | Age: 42
End: 2022-01-13

## 2022-01-13 ENCOUNTER — HOSPITAL ENCOUNTER (OUTPATIENT)
Dept: ULTRASOUND IMAGING | Facility: HOSPITAL | Age: 42
Discharge: HOME | End: 2022-01-13
Admitting: OBSTETRICS & GYNECOLOGY

## 2022-01-13 VITALS — BODY MASS INDEX: 30.11 KG/M2 | DIASTOLIC BLOOD PRESSURE: 74 MMHG | SYSTOLIC BLOOD PRESSURE: 122 MMHG | WEIGHT: 198 LBS

## 2022-01-13 DIAGNOSIS — A60.09 GENITAL HERPES AFFECTING PREGNANCY IN THIRD TRIMESTER: Primary | ICD-10-CM

## 2022-01-13 DIAGNOSIS — O09.299 HISTORY OF POSTPARTUM HEMORRHAGE, CURRENTLY PREGNANT: ICD-10-CM

## 2022-01-13 DIAGNOSIS — Z3A.33 33 WEEKS GESTATION OF PREGNANCY: ICD-10-CM

## 2022-01-13 DIAGNOSIS — O09.521 MULTIGRAVIDA OF ADVANCED MATERNAL AGE IN FIRST TRIMESTER: ICD-10-CM

## 2022-01-13 DIAGNOSIS — O98.313 GENITAL HERPES AFFECTING PREGNANCY IN THIRD TRIMESTER: Primary | ICD-10-CM

## 2022-01-13 DIAGNOSIS — Z82.79 FAMILY HISTORY OF TRISOMY 18: ICD-10-CM

## 2022-01-13 PROCEDURE — 76819 FETAL BIOPHYS PROFIL W/O NST: CPT

## 2022-01-13 PROCEDURE — 0502F SUBSEQUENT PRENATAL CARE: CPT | Performed by: OBSTETRICS & GYNECOLOGY

## 2022-01-13 NOTE — TELEPHONE ENCOUNTER
CALLED PATIENT AND LET HER KNOW THAT SHE ISN'T ANEMIC, SHE PASSED HER 3 HOUR GTT, AND SHE DOESN'T HAVE GESTATIONAL DIABETES. PATIENT VERBALIZED UNDERSTANDING.

## 2022-01-15 NOTE — PROGRESS NOTES
CC: Prenatal visit    Clifton Macdonald is a 41 y.o.  at 33w2d.  Doing well.  Denies contractions, LOF, or VB.  Reports good FM.  Reviewed preliminary BPP which was 8 out of 8    /74   Wt 89.8 kg (198 lb)   LMP 2021 (Approximate)   BMI 30.11 kg/m²     Fundal Height (cm): 33 cm  Fetal Heart Rate: 140     Problems (from 21 to present)     Problem Noted Resolved    Family history of trisomy 18 8/10/2021 by Lorenzo Briceno MD No    Overview Signed 8/10/2021  4:35 PM by Lorenzo Briceno MD     Hsd child trisomy 18.  Stillbirth diagnosed alpha-fetoprotein         History of postpartum hemorrhage, currently pregnant 8/10/2021 by Lorenzo Briceno MD No    Overview Addendum 10/20/2021  9:25 AM by Lorenzo Briceno MD     Required a uterine balloon after her last delivery  MFM recommended Methergine and Hemabate at delivery prophylactically would also use TXA         Previous Version    Multigravida of advanced maternal age in first trimester 8/10/2021 by Lorenzo Briceno MD No    Overview Addendum 10/20/2021  9:27 AM by Lorenzo Briceno MD     Wants to get cell free DNA this is arranged to get as soon as she turns 10 weeks  Recommendation for testing at 36 weeks weekly         Previous Version    Varicella  by Lorenzo Briceno MD No    Asthma  by Lorenzo Briceno MD No    Overview Signed 8/10/2021  4:31 PM by Lorenzo Briceno MD     exercise induced asthma         Genital herpes affecting pregnancy in first trimester 8/10/2021 by Lorenzo Briceno MD 2021 by Lorenzo Briceno MD          A/P: Clifton Macdonald is a 41 y.o.  at 33w2d.  Patient with advanced maternal age otherwise appropriately progressing pregnancy.  Patient doing well at this time.  8 out of 8 BPP.  Fetal kick count and  labor precautions given.  Patient to return in 1 week, sooner as needed.  - RTC in 1 weeks  - Reviewed COVID-19 visitation policy  - Reviewed COVID-19 precautions     Diagnosis Plan   1. Genital  herpes affecting pregnancy in third trimester     2. Family history of trisomy 18  US Fetal Biophysical Profile;Without Non-Stress Testing   3. History of postpartum hemorrhage, currently pregnant  US Fetal Biophysical Profile;Without Non-Stress Testing   4. Multigravida of advanced maternal age in first trimester  US Fetal Biophysical Profile;Without Non-Stress Testing   5. 33 weeks gestation of pregnancy       Heath Mon DO  1/14/2022  22:26 CST

## 2022-01-21 ENCOUNTER — ROUTINE PRENATAL (OUTPATIENT)
Dept: OBSTETRICS AND GYNECOLOGY | Facility: CLINIC | Age: 42
End: 2022-01-21

## 2022-01-21 VITALS — BODY MASS INDEX: 30.68 KG/M2 | SYSTOLIC BLOOD PRESSURE: 118 MMHG | WEIGHT: 201.8 LBS | DIASTOLIC BLOOD PRESSURE: 66 MMHG

## 2022-01-21 DIAGNOSIS — Z82.79 FAMILY HISTORY OF TRISOMY 18: ICD-10-CM

## 2022-01-21 DIAGNOSIS — O09.521 MULTIGRAVIDA OF ADVANCED MATERNAL AGE IN FIRST TRIMESTER: ICD-10-CM

## 2022-01-21 DIAGNOSIS — O09.299 HISTORY OF POSTPARTUM HEMORRHAGE, CURRENTLY PREGNANT: ICD-10-CM

## 2022-01-21 DIAGNOSIS — O09.93 SUPERVISION OF HIGH RISK PREGNANCY IN THIRD TRIMESTER: Primary | ICD-10-CM

## 2022-01-21 DIAGNOSIS — A60.09 GENITAL HERPES AFFECTING PREGNANCY IN THIRD TRIMESTER: ICD-10-CM

## 2022-01-21 DIAGNOSIS — Z3A.34 34 WEEKS GESTATION OF PREGNANCY: ICD-10-CM

## 2022-01-21 DIAGNOSIS — J45.909 ASTHMA AFFECTING PREGNANCY IN THIRD TRIMESTER: ICD-10-CM

## 2022-01-21 DIAGNOSIS — O99.513 ASTHMA AFFECTING PREGNANCY IN THIRD TRIMESTER: ICD-10-CM

## 2022-01-21 DIAGNOSIS — O98.313 GENITAL HERPES AFFECTING PREGNANCY IN THIRD TRIMESTER: ICD-10-CM

## 2022-01-21 PROCEDURE — 0502F SUBSEQUENT PRENATAL CARE: CPT | Performed by: OBSTETRICS & GYNECOLOGY

## 2022-01-24 DIAGNOSIS — O09.521 MULTIGRAVIDA OF ADVANCED MATERNAL AGE IN FIRST TRIMESTER: Primary | ICD-10-CM

## 2022-01-24 PROBLEM — N60.19 FIBROCYSTIC BREAST DISEASE (FCBD): Status: RESOLVED | Noted: 2018-01-05 | Resolved: 2022-01-24

## 2022-01-24 PROBLEM — O03.4 INCOMPLETE ABORTION: Status: RESOLVED | Noted: 2019-12-13 | Resolved: 2022-01-24

## 2022-01-24 PROBLEM — R92.2 DENSE BREASTS: Status: RESOLVED | Noted: 2018-01-05 | Resolved: 2022-01-24

## 2022-01-24 PROBLEM — Z80.3 FAMILY HISTORY OF MALIGNANT NEOPLASM OF BREAST: Status: RESOLVED | Noted: 2018-01-05 | Resolved: 2022-01-24

## 2022-01-24 PROBLEM — R92.30 DENSE BREASTS: Status: RESOLVED | Noted: 2018-01-05 | Resolved: 2022-01-24

## 2022-01-24 PROBLEM — Z00.00 ENCOUNTER FOR WELLNESS EXAMINATION: Status: RESOLVED | Noted: 2019-12-13 | Resolved: 2022-01-24

## 2022-01-24 PROBLEM — O09.93 SUPERVISION OF HIGH RISK PREGNANCY IN THIRD TRIMESTER: Status: ACTIVE | Noted: 2022-01-24

## 2022-01-24 RX ORDER — VALACYCLOVIR HYDROCHLORIDE 500 MG/1
500 TABLET, FILM COATED ORAL 2 TIMES DAILY
Qty: 60 TABLET | Refills: 2 | Status: SHIPPED | OUTPATIENT
Start: 2022-01-24

## 2022-01-24 NOTE — PROGRESS NOTES
CC: Prenatal visit    Clifton Macdonald is a 41 y.o.  at 34w2d.  Doing well.  Denies contractions, LOF, or VB.  Reports good FM.  Has some back pain and sees a chiropractor.    /66   Wt 91.5 kg (201 lb 12.8 oz)   LMP 2021 (Approximate)   BMI 30.68 kg/m²      Fetal Heart Rate: 129     Prelim US- BPP , PARISA 10.7 cm, cephalic, placenta posterior     Problems (from 21 to present)     Problem Noted Resolved    Supervision of high risk pregnancy in third trimester 2022 by Jannie Sanders MD No    Genital herpes affecting pregnancy in third trimester 2021 by Lorenzo Briceno MD No    Family history of trisomy 18 8/10/2021 by Lorenzo Briceno MD No    Overview Signed 8/10/2021  4:35 PM by Lorenzo Briceno MD     Hsd child trisomy 18.  Stillbirth diagnosed alpha-fetoprotein         History of postpartum hemorrhage, currently pregnant 8/10/2021 by Lorenzo Briceno MD No    Overview Addendum 10/20/2021  9:25 AM by Lorenzo Briceno MD     Required a uterine balloon after her last delivery  MFM recommended Methergine and Hemabate at delivery prophylactically would also use TXA         Previous Version    Multigravida of advanced maternal age in first trimester 8/10/2021 by Lorenzo Briceno MD No    Overview Addendum 10/20/2021  9:27 AM by Lorenzo Briceno MD     Wants to get cell free DNA this is arranged to get as soon as she turns 10 weeks  Recommendation for testing at 36 weeks weekly         Previous Version    Asthma affecting pregnancy in third trimester  by Lorenzo Briceno MD No    Overview Signed 8/10/2021  4:31 PM by Lorenzo Briceno MD     exercise induced asthma         Genital herpes affecting pregnancy in first trimester 8/10/2021 by Lorenzo Briceno MD 2021 by Lorenzo Briceno MD          A/P: Clifton Macdonald is a 41 y.o.  at 34w2d.  - RTC in 1 week w/ BPP/growth (AMA)  - Reviewed COVID-19 visitation policy  - Reviewed COVID-19 precautions      Diagnosis Plan   1. Supervision of high risk pregnancy in third trimester     2. Family history of trisomy 18     3. History of postpartum hemorrhage, currently pregnant     4. Multigravida of advanced maternal age in first trimester  US Ob Follow Up Transabdominal Approach   5. Genital herpes affecting pregnancy in third trimester  valACYclovir (VALTREX) 500 MG tablet   6. Asthma affecting pregnancy in third trimester     7. 34 weeks gestation of pregnancy       Jannie Sanders MD  1/24/2022  08:09 CST

## 2022-01-27 ENCOUNTER — HOSPITAL ENCOUNTER (OUTPATIENT)
Dept: ULTRASOUND IMAGING | Facility: HOSPITAL | Age: 42
Discharge: HOME | End: 2022-01-27

## 2022-01-27 ENCOUNTER — ROUTINE PRENATAL (OUTPATIENT)
Dept: OBSTETRICS AND GYNECOLOGY | Facility: CLINIC | Age: 42
End: 2022-01-27

## 2022-01-27 VITALS — WEIGHT: 204.6 LBS | BODY MASS INDEX: 31.11 KG/M2 | DIASTOLIC BLOOD PRESSURE: 64 MMHG | SYSTOLIC BLOOD PRESSURE: 122 MMHG

## 2022-01-27 DIAGNOSIS — O99.513 ASTHMA AFFECTING PREGNANCY IN THIRD TRIMESTER: ICD-10-CM

## 2022-01-27 DIAGNOSIS — O09.521 MULTIGRAVIDA OF ADVANCED MATERNAL AGE IN FIRST TRIMESTER: ICD-10-CM

## 2022-01-27 DIAGNOSIS — O36.5990 PREGNANCY AFFECTED BY FETAL GROWTH RESTRICTION: ICD-10-CM

## 2022-01-27 DIAGNOSIS — A60.09 GENITAL HERPES AFFECTING PREGNANCY IN THIRD TRIMESTER: ICD-10-CM

## 2022-01-27 DIAGNOSIS — O09.93 SUPERVISION OF HIGH RISK PREGNANCY IN THIRD TRIMESTER: Primary | ICD-10-CM

## 2022-01-27 DIAGNOSIS — Z82.79 FAMILY HISTORY OF TRISOMY 18: ICD-10-CM

## 2022-01-27 DIAGNOSIS — O98.313 GENITAL HERPES AFFECTING PREGNANCY IN THIRD TRIMESTER: ICD-10-CM

## 2022-01-27 DIAGNOSIS — J45.909 ASTHMA AFFECTING PREGNANCY IN THIRD TRIMESTER: ICD-10-CM

## 2022-01-27 DIAGNOSIS — O09.299 HISTORY OF POSTPARTUM HEMORRHAGE, CURRENTLY PREGNANT: ICD-10-CM

## 2022-01-27 PROCEDURE — 0502F SUBSEQUENT PRENATAL CARE: CPT | Performed by: STUDENT IN AN ORGANIZED HEALTH CARE EDUCATION/TRAINING PROGRAM

## 2022-01-27 PROCEDURE — 76816 OB US FOLLOW-UP PER FETUS: CPT

## 2022-01-27 PROCEDURE — 76819 FETAL BIOPHYS PROFIL W/O NST: CPT

## 2022-01-29 PROBLEM — O36.5990 PREGNANCY AFFECTED BY FETAL GROWTH RESTRICTION: Status: ACTIVE | Noted: 2022-01-29

## 2022-01-29 NOTE — PROGRESS NOTES
CC: Prenatal visit    Clifton Macdonald is a 41 y.o.  at 35w1d.  Doing well.  Denies contractions, LOF, or VB.  Reports good FM.    /64   Wt 92.8 kg (204 lb 9.6 oz)   LMP 2021 (Approximate)   BMI 31.11 kg/m²   SVE: deferred     Fetal Heart Rate: 133    Preliminary US: EFW 2286g, 15.4%ile, AC 7.2%ile, PARISA 10.2, DVP 4.17 cm, BPP     A/P: Clifton Macdonald is a 41 y.o.  at 35w1d.  - RTC in 1 weeks  - New dx FGR by US today, as below  - Reviewed COVID-19 visitation policy  - Reviewed COVID-19 precautions  Problem List Items Addressed This Visit        Family History    Family history of trisomy 18    Overview     Hsd child trisomy 18.  Stillbirth diagnosed alpha-fetoprotein            Gravid and     Asthma affecting pregnancy in third trimester    Overview     exercise induced asthma         History of postpartum hemorrhage, currently pregnant    Overview     Required a uterine balloon after her last delivery  MFM recommended Methergine and Hemabate at delivery prophylactically would also use TXA         Multigravida of advanced maternal age in first trimester    Overview     NIPS low risk female         Supervision of high risk pregnancy in third trimester    Relevant Orders    US color flow doppler umbilical artery    Pregnancy affected by fetal growth restriction - Primary    Overview      Growth with overall EFW 15.4% but AC 7.2%, no dopplers performed  Plan for twice weekly  testing with weekly BPP and dopplers         Relevant Orders    US color flow doppler umbilical artery       Other    Genital herpes affecting pregnancy in third trimester    Overview     Compliant with ppx                  Diagnosis Plan   1. Pregnancy affected by fetal growth restriction  US color flow doppler umbilical artery   2. Supervision of high risk pregnancy in third trimester  US color flow doppler umbilical artery   3. Genital herpes affecting pregnancy in third trimester     4. Family  history of trisomy 18     5. History of postpartum hemorrhage, currently pregnant     6. Multigravida of advanced maternal age in first trimester     7. Asthma affecting pregnancy in third trimester       Malgorzata Robles DO  1/29/2022  16:03 CST

## 2022-02-01 ENCOUNTER — TELEPHONE (OUTPATIENT)
Dept: OBSTETRICS AND GYNECOLOGY | Facility: CLINIC | Age: 42
End: 2022-02-01

## 2022-02-01 NOTE — TELEPHONE ENCOUNTER
Patient had ultrasound [facility] radiology suggestive of abdominal circumference less than 10th percentile.  Requested consultative ultrasound at [place] because she had been seen there before because of advanced maternal age.  This was done and estimated fetal weight was 46 percentile and abdominal circumference was 37th percentile.  It is noted that this due date is from a 14-week ultrasound but was the date recommended per [facility] on [place]

## 2022-02-01 NOTE — TELEPHONE ENCOUNTER
----- Message from Palak Ko MA sent at 2/1/2022  8:06 AM CST -----  Regarding: FW: Scheduling ultrasound     ----- Message -----  From: Clifton Macdonald  Sent: 1/31/2022   6:08 PM CST  To: [facility] Clinical Pool  Subject: Scheduling ultrasound                            Since I had ultrasound with Doppler today in [place] I won’t need the one scheduled at [facility] for 11:15 this Thursday.    Just let me know if Dr Briceno wants me to keep the scheduled AM appts 2/11 and 2/18 or if we need find another day the next two weeks. Thanks!   Talk to patient and after reviewing risk benefits alternatives and recommendations from [facility] in [place] will see next week with NST

## 2022-02-03 ENCOUNTER — APPOINTMENT (OUTPATIENT)
Dept: ULTRASOUND IMAGING | Facility: HOSPITAL | Age: 42
End: 2022-02-03

## 2022-02-08 ENCOUNTER — ROUTINE PRENATAL (OUTPATIENT)
Dept: OBSTETRICS AND GYNECOLOGY | Facility: CLINIC | Age: 42
End: 2022-02-08

## 2022-02-08 VITALS — SYSTOLIC BLOOD PRESSURE: 118 MMHG | DIASTOLIC BLOOD PRESSURE: 70 MMHG | BODY MASS INDEX: 30.62 KG/M2 | WEIGHT: 201.4 LBS

## 2022-02-08 DIAGNOSIS — O36.5990 PREGNANCY AFFECTED BY FETAL GROWTH RESTRICTION: ICD-10-CM

## 2022-02-08 DIAGNOSIS — A60.09 GENITAL HERPES AFFECTING PREGNANCY IN THIRD TRIMESTER: ICD-10-CM

## 2022-02-08 DIAGNOSIS — Z82.79 FAMILY HISTORY OF TRISOMY 18: ICD-10-CM

## 2022-02-08 DIAGNOSIS — J45.909 ASTHMA AFFECTING PREGNANCY IN THIRD TRIMESTER: ICD-10-CM

## 2022-02-08 DIAGNOSIS — O09.299 HISTORY OF POSTPARTUM HEMORRHAGE, CURRENTLY PREGNANT: ICD-10-CM

## 2022-02-08 DIAGNOSIS — O09.521 MULTIGRAVIDA OF ADVANCED MATERNAL AGE IN FIRST TRIMESTER: ICD-10-CM

## 2022-02-08 DIAGNOSIS — O09.93 SUPERVISION OF HIGH RISK PREGNANCY IN THIRD TRIMESTER: ICD-10-CM

## 2022-02-08 DIAGNOSIS — O28.8 NON-REACTIVE NST (NON-STRESS TEST): ICD-10-CM

## 2022-02-08 DIAGNOSIS — Z36.85 ENCOUNTER FOR ANTENATAL SCREENING FOR STREPTOCOCCUS B: Primary | ICD-10-CM

## 2022-02-08 DIAGNOSIS — Z3A.36 36 WEEKS GESTATION OF PREGNANCY: ICD-10-CM

## 2022-02-08 DIAGNOSIS — O98.313 GENITAL HERPES AFFECTING PREGNANCY IN THIRD TRIMESTER: ICD-10-CM

## 2022-02-08 DIAGNOSIS — O99.513 ASTHMA AFFECTING PREGNANCY IN THIRD TRIMESTER: ICD-10-CM

## 2022-02-08 PROCEDURE — 0502F SUBSEQUENT PRENATAL CARE: CPT | Performed by: OBSTETRICS & GYNECOLOGY

## 2022-02-08 PROCEDURE — 59025 FETAL NON-STRESS TEST: CPT | Performed by: OBSTETRICS & GYNECOLOGY

## 2022-02-08 PROCEDURE — 87653 STREP B DNA AMP PROBE: CPT | Performed by: OBSTETRICS & GYNECOLOGY

## 2022-02-08 RX ORDER — SODIUM CHLORIDE 0.9 % (FLUSH) 0.9 %
3 SYRINGE (ML) INJECTION EVERY 12 HOURS SCHEDULED
Status: CANCELLED | OUTPATIENT
Start: 2022-02-08

## 2022-02-08 RX ORDER — PROMETHAZINE HYDROCHLORIDE 25 MG/1
12.5 SUPPOSITORY RECTAL EVERY 6 HOURS PRN
Status: CANCELLED | OUTPATIENT
Start: 2022-02-08

## 2022-02-08 RX ORDER — METHYLERGONOVINE MALEATE 0.2 MG/ML
200 INJECTION INTRAVENOUS ONCE AS NEEDED
Status: CANCELLED | OUTPATIENT
Start: 2022-02-08

## 2022-02-08 RX ORDER — CARBOPROST TROMETHAMINE 250 UG/ML
250 INJECTION, SOLUTION INTRAMUSCULAR AS NEEDED
Status: CANCELLED | OUTPATIENT
Start: 2022-02-08

## 2022-02-08 RX ORDER — ACETYLCYSTEINE 600 MG
10 CAPSULE ORAL ONCE
COMMUNITY

## 2022-02-08 RX ORDER — SODIUM CHLORIDE 0.9 % (FLUSH) 0.9 %
3-10 SYRINGE (ML) INJECTION AS NEEDED
Status: CANCELLED | OUTPATIENT
Start: 2022-02-08

## 2022-02-08 RX ORDER — PROMETHAZINE HYDROCHLORIDE 25 MG/1
12.5 TABLET ORAL EVERY 6 HOURS PRN
Status: CANCELLED | OUTPATIENT
Start: 2022-02-08

## 2022-02-08 RX ORDER — LIDOCAINE HYDROCHLORIDE 10 MG/ML
5 INJECTION, SOLUTION EPIDURAL; INFILTRATION; INTRACAUDAL; PERINEURAL AS NEEDED
Status: CANCELLED | OUTPATIENT
Start: 2022-02-08

## 2022-02-08 RX ORDER — MISOPROSTOL 100 UG/1
800 TABLET ORAL AS NEEDED
Status: CANCELLED | OUTPATIENT
Start: 2022-02-08

## 2022-02-08 NOTE — PROGRESS NOTES
CC: Prenatal visit     Clifton Macdonald is a 41 y.o.  at 36w6d.  Doing well.  Denies contractions, LOF, or VB.  Reports good  FM.    /70   Wt 91.4 kg (201 lb 6.4 oz)   LMP 2021 (Approximate)   BMI 30.62 kg/m²   SVE: Finger tip           Problems (from 21 to present)     Problem Noted Resolved    Genital herpes affecting pregnancy in third trimester 2021 by Lorenzo Briceno MD No    Priority:  High      Overview Addendum 2022  4:02 PM by Malgorzata Robles DO     Compliant with ppx         Previous Version    Family history of trisomy 18 8/10/2021 by Lorenzo Briceno MD No    Priority:  High      Overview Signed 8/10/2021  4:35 PM by Lorenzo Briceno MD     Hsd child trisomy 18.  Stillbirth diagnosed alpha-fetoprotein         History of postpartum hemorrhage, currently pregnant 8/10/2021 by Lorenzo Briceno MD No    Priority:  High      Overview Addendum 10/20/2021  9:25 AM by Lorenzo Briceno MD     Required a uterine balloon after her last delivery  MFM recommended Methergine and Hemabate at delivery prophylactically would also use TXA         Previous Version    Multigravida of advanced maternal age in first trimester 8/10/2021 by Lorenzo Briceno MD No    Priority:  High      Overview Addendum 2022  4:03 PM by Malgorzata Robles DO     NIPS low risk female         Previous Version    Asthma affecting pregnancy in third trimester  by Lorenzo Briceno MD No    Priority:  High      Overview Signed 8/10/2021  4:31 PM by Lorenzo Briceno MD     exercise induced asthma         Pregnancy affected by fetal growth restriction 2022 by Malgorzata Robles DO No    Overview Signed 2022  4:02 PM by Malgorzata Robles DO      Growth with overall EFW 15.4% but AC 7.2%, no dopplers performed  Plan for twice weekly  testing with weekly BPP and dopplers         Supervision of high risk pregnancy in third trimester 2022 by Jannie Sanders MD No    Genital herpes  affecting pregnancy in first trimester 8/10/2021 by Lorenzo Briceno MD 2021 by Lorenzo Briceno MD    Priority:  High            A/P: Clifton Macdonald is a 41 y.o.  at 36w6d.  - RTC in 1 weeks with fetal wellbeing testing  - Reviewed COVID-19 visitation policy  - Reviewed COVID-19 precautions     Diagnosis Plan   1. Encounter for  screening for Streptococcus B  Group B Strep (Molecular) - Swab, Vaginal/Rectum    Group B Strep (Molecular) - Swab, Vaginal/Rectum GBS done today   2. Pregnancy affected by fetal growth restriction  .  Been a question of IUGR on ultrasound done 2022 at [place] [id number] done at [place] had been more reassuring along with MFM consult but with concern we will plan to deliver just before 39 weeks after extended discussion on risk benefits alternatives with patient and .  I discussed the risk of induction of labor hypertonic contractions damage mother baby increased risk of  section risk of other complications including hemorrhage which she has had postpartum in the past   3. Supervision of high risk pregnancy in third trimester     4. Genital herpes affecting pregnancy in third trimester   denies any signs or symptoms of herpes on examination no evidence of active herpes   5. Family history of trisomy 18     6. History of postpartum hemorrhage, currently pregnant   MFM recommendation for prophylactic Hemabate risk benefits alternatives reviewed   7. Multigravida of advanced maternal age in first trimester     8. Asthma affecting pregnancy in third trimester     9. Non-reactive NST (non-stress test)  US Fetal Biophysical Profile;Without Non-Stress Testing   10. 36 weeks gestation of pregnancy       Lorenzo Briceno MD  2022  15:42 CST

## 2022-02-08 NOTE — H&P
Obstetric History and Physical    Chief Complaint   Patient presents with   •  Advanced maternal age possible intrauterine growth restriction           Patient is a 41 y.o. female  currently at 36w6d, who presents with patient is being followed for advanced maternal age possible intrauterine growth restriction.  She had ultrasound 2022.  Done at [facility] radiology show possible intrauterine growth restriction basis abdominal circumference.  She had consultative ultrasound and consult done at [facility] which was reassuring but given concern about possible IUGR and high risk nature after reviewing the risk benefits alternatives we will plan for delivery at 38-6/7weeks even though fetal wellbeing thus far has been generally reassuring.  Biophysical profile today was 8 of 10 off for equivocal nonstress test.  Strict kick counts emphasized we will see back in 1 week for reevaluation with fetal wellbeing testing.  Patient has been induced in the past risk of induction of hypertonic tractions mother or baby possible risk of  section she has had postpartum hemorrhage and required a balloon with her last delivery.  MFM recommended he have prophylacti Hemabate.  Risk benefits alternatives Hemabate reviewed.    Her prenatal care is has been through [facility] with consultation with LUIS at [facility] as because of her prior relationship with Dr. Ocasio     Obstetric History   # 1 - Date: 03, Sex: Female, Weight: 3459 g (7 lb 10 oz), GA: 41w0d, Delivery: Vaginal, Spontaneous, Apgar1: None, Apgar5: None, Living: Living, Birth Comments: None    # 2 - Date: 06, Sex: Male, Weight: 3799 g (8 lb 6 oz), GA: None, Delivery: Vaginal, Spontaneous, Apgar1: None, Apgar5: None, Living: Living, Birth Comments: None    # 3 - Date: 2011, Sex: None, Weight: None, GA: 21w0d, Delivery: None, Apgar1: None, Apgar5: None, Living: Fetal Demise, Birth Comments: IOL for fetal demise. baby had Trisomy 18    # 4 -  Date: 10/26/12, Sex: Female, Weight: 3118 g (6 lb 14 oz), GA: 40w4d, Delivery: Vaginal, Spontaneous, Apgar1: None, Apgar5: None, Living: Living, Birth Comments: postpartum D&C was done    # 5 - Date: 12/2019, Sex: None, Weight: None, GA: None, Delivery: None, Apgar1: None, Apgar5: None, Living: None, Birth Comments: None    # 6 - Date: None, Sex: None, Weight: None, GA: None, Delivery: None, Apgar1: None, Apgar5: None, Living: None, Birth Comments: None       The following portions of the patients history were reviewed and updated as appropriate: current medications, allergies, past medical history, past surgical history, past family history, past social history and problem list .       Prenatal Information:  Prenatal Results     POC Urine Glucose/Protein     Test Value Reference Range Date Time    Urine Glucose        Urine Protein              Initial Prenatal Labs     Test Value Reference Range Date Time    Hemoglobin  14.8 g/dL 12.0 - 15.9 08/09/21 1423    Hematocrit  43.1 % 34.0 - 46.6 08/09/21 1423    Platelets  301 10*3/mm3 140 - 450 08/09/21 1423    Rubella IgG  2.82 index Immune >0.99 08/09/21 1423    Hepatitis B SAg  Non-Reactive  Non-Reactive 08/09/21 1423    Hepatitis C Ab  Non-Reactive  Non-Reactive 08/09/21 1423    RPR  Non-Reactive  Non-Reactive 08/09/21 1423    ABO  B   08/09/21 1423    Rh  Positive   08/09/21 1423    Antibody Screen  Negative   08/09/21 1423    HIV  Non-Reactive  Non-Reactive 08/09/21 1423    Urine Culture  No growth   08/09/21 1423    Gonorrhea  Negative  Negative 08/09/21 1423    Chlamydia  Negative  Negative 08/09/21 1423    TSH  1.820 uIU/mL 0.270 - 4.200 07/14/21 1643    HgB A1c               2nd and 3rd Trimester     Test Value Reference Range Date Time    Hemoglobin (repeated)        Hematocrit (repeated)        Platelets   301 10*3/mm3 140 - 450 08/09/21 1423    GCT  154 mg/dL 65 - 139 12/16/21 0841       103 mg/dL 65 - 139 09/20/21 0942    Antibody Screen (repeated)         GTT Fasting        GTT 1 Hr        GTT 2 Hr        GTT 3 Hr        Group B Strep              Drug Screening     Test Value Reference Range Date Time    Amphetamine Screen  Negative  Negative 08/09/21 1423    Barbiturate Screen  Negative  Negative 08/09/21 1423    Benzodiazepine Screen  Negative  Negative 08/09/21 1423    Methadone Screen  Negative  Negative 08/09/21 1423    Phencyclidine Screen  Negative  Negative 08/09/21 1423    Opiates Screen  Negative  Negative 08/09/21 1423    THC Screen  Negative  Negative 08/09/21 1423    Cocaine Screen  Negative  Negative 08/09/21 1423    Propoxyphene Screen  Negative  Negative 08/09/21 1423    Buprenorphine Screen  Negative  Negative 08/09/21 1423    Methamphetamine Screen  Negative  Negative 08/09/21 1423    Oxycodone Screen  Negative  Negative 08/09/21 1423    Tricyclic Antidepressants Screen  Negative  Negative 08/09/21 1423          Other (Risk screening)     Test Value Reference Range Date Time    Varicella IgG        Parvovirus IgG        CMV IgG        Cystic Fibrosis        Hemoglobin electrophoresis        NIPT        MSAFP-4        AFP (for NTD only)              Legend    ^: Historical                      External Prenatal Results     Pregnancy Outside Results - Transcribed From Office Records - See Scanned Records For Details     Test Value Date Time    ABO  B  08/09/21 1423    Rh  Positive  08/09/21 1423    Antibody Screen  Negative  08/09/21 1423    Varicella IgG       Rubella  2.82 index 08/09/21 1423    Hgb  14.8 g/dL 08/09/21 1423    Hct  43.1 % 08/09/21 1423    Glucose Fasting GTT       Glucose Tolerance Test 1 hour       Glucose Tolerance Test 3 hour       Gonorrhea (discrete)  Negative  08/09/21 1423    Chlamydia (discrete)  Negative  08/09/21 1423    RPR  Non-Reactive  08/09/21 1423    VDRL       Syphilis Antibody       HBsAg  Non-Reactive  08/09/21 1423    Herpes Simplex Virus PCR       Herpes Simplex VIrus Culture       HIV  Non-Reactive  08/09/21  1423    Hep C RNA Quant PCR       Hep C Antibody  Non-Reactive  21 1423    AFP       Group B Strep       GBS Susceptibility to Clindamycin       GBS Susceptibility to Erythromycin       Fetal Fibronectin  Negative  21 0939    Genetic Testing, Maternal Blood             Drug Screening     Test Value Date Time    Urine Drug Screen       Amphetamine Screen  Negative  21 1423    Barbiturate Screen  Negative  21 1423    Benzodiazepine Screen  Negative  21 1423    Methadone Screen  Negative  21 1423    Phencyclidine Screen  Negative  21 1423    Opiates Screen  Negative  21 1423    THC Screen  Negative  21 1423    Cocaine Screen       Propoxyphene Screen  Negative  21 1423    Buprenorphine Screen  Negative  21 1423    Methamphetamine Screen       Oxycodone Screen  Negative  21 1423    Tricyclic Antidepressants Screen  Negative  21 1423          Legend    ^: Historical                         Past OB History:     OB History    Para Term  AB Living   6 4 3 1 1 3   SAB IAB Ectopic Molar Multiple Live Births   1 0 0 0 0 3      # Outcome Date GA Lbr Enrrique/2nd Weight Sex Delivery Anes PTL Lv   6 Current            5 SAB 2019           4 Term 10/26/12 40w4d  3118 g (6 lb 14 oz) F Vag-Spont  N TESSIE      Birth Comments: postpartum D&C was done      Complications: Meconium stained amniotic fluid, Oligohydramnios, Postpartum hemorrhage   3  2011 21w0d      N FD      Birth Comments: IOL for fetal demise. baby had Trisomy 18      Complications: Other Excessive Bleeding   2 Term 06   3799 g (8 lb 6 oz) M Vag-Spont  N TESSIE      Complications: Other Excessive Bleeding   1 Term 03 41w0d  3459 g (7 lb 10 oz) F Vag-Spont  N TESSIE        ALLERGIES:     Allergies   Allergen Reactions   • Neomycin Rash        Home Medications:     Prior to Admission medications    Medication Sig Start Date End Date Taking? Authorizing Provider    Acetylcysteine (NAC) capsule capsule Take 600 mg by mouth 1 (One) Time.   Yes Dottie Harrell MD   aspirin 81 MG chewable tablet Chew 81 mg Daily.   Yes Dottie Harrell MD   Cyanocobalamin (B-12 Compliance Injection) 1000 MCG/ML kit Inject  as directed.   Yes Dottie Harrell MD   diphenhydrAMINE (BENADRYL) 25 mg capsule Take 25 mg by mouth Every 6 (Six) Hours As Needed for Itching.   Yes Dotite Harrell MD   Lifitegrast (XIIDRA OP) Apply  to eye(s) as directed by provider.   Yes Dottie Harrell MD   Prenatal Vit-Fe Fumarate-FA (PRENATAL VITAMIN 27-0.8) 27-0.8 MG tablet tablet Take  by mouth Daily.   Yes Dottie Harrell MD   valACYclovir (VALTREX) 500 MG tablet Take 1 tablet by mouth 2 (Two) Times a Day. 1/24/22  Yes Jannie Sanders MD   Vitamin D, Cholecalciferol, (CHOLECALCIFEROL) 10 MCG (400 UNIT) tablet Take 400 Units by mouth Daily.   Yes Dottie Harrell MD       Past Medical History: Past Medical History:   Diagnosis Date   • Anxiety    • Asthma     exercise induced asthma   • B12 deficiency    • Fibrocystic breast    • Herpes    • Migraine    • Varicella       Past Surgical History Past Surgical History:   Procedure Laterality Date   • BREAST AUGMENTATION     • CYSTECTOMY  2011    breast   • D & C WITH SUCTION     • DILATATION AND CURETTAGE     • OTHER SURGICAL HISTORY  10/2018    IVF   • VULVA SURGERY        Family History: Family History   Problem Relation Age of Onset   • Osteoarthritis Father    • Rheum arthritis Mother    • No Known Problems Brother    • No Known Problems Son    • Heart defect Daughter    • Rheum arthritis Maternal Grandmother    • No Known Problems Daughter    • Breast cancer Maternal Aunt    • Breast cancer Maternal Aunt    • Breast cancer Maternal Aunt    • Uterine cancer Maternal Aunt       Social History:  reports that she has never smoked. She has never used smokeless tobacco.   reports previous alcohol use.   reports no  history of drug use.        Review of Systems                                                                                                                      Constitutional: Denies night sweats    HENT: No hearing changes, denies ear pain    Eye: No eye pain; no foreign body in eye    Pulmonary: No hemoptysis    Cardiovascular: No claudication    GI: No hematemesis    Musculoskeletal: No arthralgias, no joint swelling    Endocrine: No polydipsia or polyuria    Hematologic: Denies any free bleeding    Psychiatric: Denies any delusions      Objective     Documented Vitals    22 0840   BP: 118/70   Weight: 91.4 kg (201 lb 6.4 oz)          OBGyn Exam  Constitutional: Appears to be in no acute distress; Eyes: sclera normal; Endocrine system: thyroid palpate is normal; Pulmonary system: lungs clear; Cardiovascular system: heart regular rate and rhythm; Gastrointestinal system: abdomen soft nontender, active bowel sounds; Urologic system: CVA negative; Psychiatric: appropriate insight; Neurologic: gait within normal limits fingertip      Last Labs  Lab Results   Component Value Date    WBC 8.21 2021    RBC 4.64 2021    HGB 14.8 2021    HCT 43.1 2021    MCV 92.9 2021    MCH 31.9 2021    MCHC 34.3 2021    RDW 11.9 (L) 2021    RDWSD 40.5 2021    MPV 11.1 2021     2021        No results found for: GLUCOSE, BUN, CREATININE, NA, K, CL, CO2, CALCIUM, PROTEINTOT, ALBUMIN, ALT, AST, ALKPHOS, BILITOT, EGFRIFNONA, GLOB, AGRATIO, BCR, ANIONGAP, BILIDIR, INDBILI    Lab Results   Component Value Date    HCGQUAL Positive (A) 2019         Assessment/Plan:  1. 41 y.o. [id number].  Pregnancy complicated by advanced maternal age concern possible intrauterine growth restriction after reviewing risk benefits alternatives were gone plan for delivery at 38-6/7 weeks risk benefits alternatives reviewed at length.  2.   3. High risk for postpartum  hemorrhage consider prophylactic Hemabate    Clifton Macdonald and I have discussed pain goals for this hospitalization after reviewing her current clinical condition, medical history and prior pain experiences.  The goal is to keep her pain level 3.  To help achieve this, I plan to multimodal pain management.       This document has been electronically signed by Lorenzo Briceno MD on February 8, 2022 15:51 CST\    Please note that portions of this note were completed with a voice recognition program.

## 2022-02-09 DIAGNOSIS — O09.523 MULTIGRAVIDA OF ADVANCED MATERNAL AGE IN THIRD TRIMESTER: Primary | ICD-10-CM

## 2022-02-10 LAB — GROUP B STREP, DNA: NEGATIVE

## 2022-02-11 DIAGNOSIS — O09.523 MULTIGRAVIDA OF ADVANCED MATERNAL AGE IN THIRD TRIMESTER: ICD-10-CM

## 2022-02-15 ENCOUNTER — ROUTINE PRENATAL (OUTPATIENT)
Dept: OBSTETRICS AND GYNECOLOGY | Facility: CLINIC | Age: 42
End: 2022-02-15

## 2022-02-15 VITALS — DIASTOLIC BLOOD PRESSURE: 74 MMHG | SYSTOLIC BLOOD PRESSURE: 116 MMHG

## 2022-02-15 DIAGNOSIS — A60.09 GENITAL HERPES AFFECTING PREGNANCY IN THIRD TRIMESTER: ICD-10-CM

## 2022-02-15 DIAGNOSIS — Z3A.38 38 WEEKS GESTATION OF PREGNANCY: Primary | ICD-10-CM

## 2022-02-15 DIAGNOSIS — O09.521 MULTIGRAVIDA OF ADVANCED MATERNAL AGE IN FIRST TRIMESTER: ICD-10-CM

## 2022-02-15 DIAGNOSIS — O09.523 MULTIGRAVIDA OF ADVANCED MATERNAL AGE IN THIRD TRIMESTER: Primary | ICD-10-CM

## 2022-02-15 DIAGNOSIS — O09.299 HISTORY OF POSTPARTUM HEMORRHAGE, CURRENTLY PREGNANT: ICD-10-CM

## 2022-02-15 DIAGNOSIS — O09.93 SUPERVISION OF HIGH RISK PREGNANCY IN THIRD TRIMESTER: ICD-10-CM

## 2022-02-15 DIAGNOSIS — Z82.79 FAMILY HISTORY OF TRISOMY 18: ICD-10-CM

## 2022-02-15 DIAGNOSIS — J45.909 ASTHMA AFFECTING PREGNANCY IN THIRD TRIMESTER: ICD-10-CM

## 2022-02-15 DIAGNOSIS — O98.313 GENITAL HERPES AFFECTING PREGNANCY IN THIRD TRIMESTER: ICD-10-CM

## 2022-02-15 DIAGNOSIS — O99.513 ASTHMA AFFECTING PREGNANCY IN THIRD TRIMESTER: ICD-10-CM

## 2022-02-15 DIAGNOSIS — O36.5990 PREGNANCY AFFECTED BY FETAL GROWTH RESTRICTION: ICD-10-CM

## 2022-02-15 PROCEDURE — 0502F SUBSEQUENT PRENATAL CARE: CPT | Performed by: OBSTETRICS & GYNECOLOGY

## 2022-02-15 PROCEDURE — 59025 FETAL NON-STRESS TEST: CPT | Performed by: OBSTETRICS & GYNECOLOGY

## 2022-02-15 NOTE — PROGRESS NOTES
CC: Prenatal visit    Clifton Macdonald is a 41 y.o.  at 37w6d.  Doing well.  Denies contractions, LOF, or VB.  Reports good FM.    /74   LMP 2021 (Approximate)      Fundal Height (cm): 38 cm  Fetal Heart Rate: 150     Problems (from 21 to present)     Problem Noted Resolved    Genital herpes affecting pregnancy in third trimester 2021 by Lorenzo Briceno MD No    Priority:  High      Overview Addendum 2022  4:02 PM by Malgorzata Robles DO     Compliant with ppx         Previous Version    Family history of trisomy 18 8/10/2021 by Lorenzo Briceno MD No    Priority:  High      Overview Signed 8/10/2021  4:35 PM by Lorenzo Briceno MD     Hsd child trisomy 18.  Stillbirth diagnosed alpha-fetoprotein         History of postpartum hemorrhage, currently pregnant 8/10/2021 by Lorenzo Briceno MD No    Priority:  High      Overview Addendum 10/20/2021  9:25 AM by Lorenzo Briceno MD     Required a uterine balloon after her last delivery  MFM recommended Methergine and Hemabate at delivery prophylactically would also use TXA         Previous Version    Multigravida of advanced maternal age in first trimester 8/10/2021 by Lorenzo Briceno MD No    Priority:  High      Overview Addendum 2022  4:03 PM by Malgorzata Robles DO     NIPS low risk female         Previous Version    Asthma affecting pregnancy in third trimester  by Lorenzo Briceno MD No    Priority:  High      Overview Signed 8/10/2021  4:31 PM by Lorenzo Briceno MD     exercise induced asthma         Pregnancy affected by fetal growth restriction 2022 by Malgorzata Robles DO No    Overview Signed 2022  4:02 PM by Malgorzata Robles DO      Growth with overall EFW 15.4% but AC 7.2%, no dopplers performed  Plan for twice weekly  testing with weekly BPP and dopplers         Supervision of high risk pregnancy in third trimester 2022 by Jannie Sanders MD No    Genital herpes affecting pregnancy  in first trimester 8/10/2021 by Lorenzo Briceno MD 2021 by Lorenzo Briceno MD    Priority:  High            A/P: Clifton Macdonald is a 41 y.o.  at 37w6d.  -Scheduled for induction next week risk benefits alternatives again reviewed  - Reviewed COVID-19 visitation policy  - Reviewed COVID-19 precautions     Diagnosis Plan   1. 38 weeks gestation of pregnancy     2. Pregnancy affected by fetal growth restriction   scheduled for delivery please see H&P   3. Supervision of high risk pregnancy in third trimester     4. Genital herpes affecting pregnancy in third trimester   denies any lesions or unusual sensations   5. Family history of trisomy 18     6. History of postpartum hemorrhage, currently pregnant     7. Multigravida of advanced maternal age in first trimester     8. Asthma affecting pregnancy in third trimester       Lorenzo Briceno MD  2/15/2022  13:08 CST

## 2022-02-16 DIAGNOSIS — O09.523 MULTIGRAVIDA OF ADVANCED MATERNAL AGE IN THIRD TRIMESTER: ICD-10-CM

## 2022-02-22 ENCOUNTER — HOSPITAL ENCOUNTER (INPATIENT)
Dept: LABOR AND DELIVERY | Facility: HOSPITAL | Age: 42
Discharge: HOME | End: 2022-02-22

## 2022-02-22 ENCOUNTER — ANESTHESIA EVENT (OUTPATIENT)
Dept: LABOR AND DELIVERY | Facility: HOSPITAL | Age: 42
End: 2022-02-22

## 2022-02-22 ENCOUNTER — HOSPITAL ENCOUNTER (INPATIENT)
Facility: HOSPITAL | Age: 42
LOS: 2 days | Discharge: HOME | End: 2022-02-24
Attending: OBSTETRICS & GYNECOLOGY | Admitting: OBSTETRICS & GYNECOLOGY

## 2022-02-22 ENCOUNTER — ANESTHESIA (OUTPATIENT)
Dept: LABOR AND DELIVERY | Facility: HOSPITAL | Age: 42
End: 2022-02-22

## 2022-02-22 DIAGNOSIS — O36.5930 POOR FETAL GROWTH AFFECTING MANAGEMENT OF MOTHER IN THIRD TRIMESTER, SINGLE OR UNSPECIFIED FETUS: Primary | ICD-10-CM

## 2022-02-22 DIAGNOSIS — Z98.891 STATUS POST PRIMARY LOW TRANSVERSE CESAREAN SECTION: ICD-10-CM

## 2022-02-22 DIAGNOSIS — O09.93 SUPERVISION OF HIGH RISK PREGNANCY IN THIRD TRIMESTER: ICD-10-CM

## 2022-02-22 DIAGNOSIS — O36.5990 PREGNANCY AFFECTED BY FETAL GROWTH RESTRICTION: ICD-10-CM

## 2022-02-22 DIAGNOSIS — O09.521 MULTIGRAVIDA OF ADVANCED MATERNAL AGE IN FIRST TRIMESTER: ICD-10-CM

## 2022-02-22 LAB
ABO GROUP BLD: NORMAL
AMPHET+METHAMPHET UR QL: NEGATIVE
AMPHETAMINES UR QL: NEGATIVE
BARBITURATES UR QL SCN: NEGATIVE
BENZODIAZ UR QL SCN: NEGATIVE
BLD GP AB SCN SERPL QL: NEGATIVE
BUPRENORPHINE SERPL-MCNC: NEGATIVE NG/ML
CANNABINOIDS SERPL QL: NEGATIVE
COCAINE UR QL: NEGATIVE
DEPRECATED RDW RBC AUTO: 43.2 FL (ref 37–54)
ERYTHROCYTE [DISTWIDTH] IN BLOOD BY AUTOMATED COUNT: 13.2 % (ref 12.3–15.4)
HCT VFR BLD AUTO: 36.7 % (ref 34–46.6)
HGB BLD-MCNC: 12.9 G/DL (ref 12–15.9)
HOLD SPECIMEN: NORMAL
Lab: NORMAL
MCH RBC QN AUTO: 31.7 PG (ref 26.6–33)
MCHC RBC AUTO-ENTMCNC: 35.1 G/DL (ref 31.5–35.7)
MCV RBC AUTO: 90.2 FL (ref 79–97)
METHADONE UR QL SCN: NEGATIVE
OPIATES UR QL: NEGATIVE
OXYCODONE UR QL SCN: NEGATIVE
PCP UR QL SCN: NEGATIVE
PLATELET # BLD AUTO: 280 10*3/MM3 (ref 140–450)
PMV BLD AUTO: 10.2 FL (ref 6–12)
PROPOXYPH UR QL: NEGATIVE
RBC # BLD AUTO: 4.07 10*6/MM3 (ref 3.77–5.28)
RH BLD: POSITIVE
SARS-COV-2 N GENE RESP QL NAA+PROBE: NOT DETECTED
T&S EXPIRATION DATE: NORMAL
TRICYCLICS UR QL SCN: NEGATIVE
WBC NRBC COR # BLD: 7.96 10*3/MM3 (ref 3.4–10.8)

## 2022-02-22 PROCEDURE — 51703 INSERT BLADDER CATH COMPLEX: CPT

## 2022-02-22 PROCEDURE — 25010000002 MORPHINE PER 10 MG: Performed by: NURSE ANESTHETIST, CERTIFIED REGISTERED

## 2022-02-22 PROCEDURE — 85027 COMPLETE CBC AUTOMATED: CPT | Performed by: OBSTETRICS & GYNECOLOGY

## 2022-02-22 PROCEDURE — C1889 IMPLANT/INSERT DEVICE, NOC: HCPCS | Performed by: OBSTETRICS & GYNECOLOGY

## 2022-02-22 PROCEDURE — 94799 UNLISTED PULMONARY SVC/PX: CPT

## 2022-02-22 PROCEDURE — 86923 COMPATIBILITY TEST ELECTRIC: CPT

## 2022-02-22 PROCEDURE — 87635 SARS-COV-2 COVID-19 AMP PRB: CPT | Performed by: OBSTETRICS & GYNECOLOGY

## 2022-02-22 PROCEDURE — 63710000001 DIPHENHYDRAMINE PER 50 MG: Performed by: NURSE ANESTHETIST, CERTIFIED REGISTERED

## 2022-02-22 PROCEDURE — 86850 RBC ANTIBODY SCREEN: CPT | Performed by: OBSTETRICS & GYNECOLOGY

## 2022-02-22 PROCEDURE — 25010000002 CEFAZOLIN PER 500 MG: Performed by: OBSTETRICS & GYNECOLOGY

## 2022-02-22 PROCEDURE — 59514 CESAREAN DELIVERY ONLY: CPT | Performed by: SPECIALIST/TECHNOLOGIST, OTHER

## 2022-02-22 PROCEDURE — 80306 DRUG TEST PRSMV INSTRMNT: CPT | Performed by: OBSTETRICS & GYNECOLOGY

## 2022-02-22 PROCEDURE — 63710000001 DIPHENHYDRAMINE PER 50 MG: Performed by: OBSTETRICS & GYNECOLOGY

## 2022-02-22 PROCEDURE — 86901 BLOOD TYPING SEROLOGIC RH(D): CPT | Performed by: OBSTETRICS & GYNECOLOGY

## 2022-02-22 PROCEDURE — 59510 CESAREAN DELIVERY: CPT | Performed by: OBSTETRICS & GYNECOLOGY

## 2022-02-22 PROCEDURE — 86900 BLOOD TYPING SEROLOGIC ABO: CPT | Performed by: OBSTETRICS & GYNECOLOGY

## 2022-02-22 PROCEDURE — 25010000002 ONDANSETRON PER 1 MG: Performed by: NURSE ANESTHETIST, CERTIFIED REGISTERED

## 2022-02-22 PROCEDURE — 25010000002 KETOROLAC TROMETHAMINE PER 15 MG: Performed by: OBSTETRICS & GYNECOLOGY

## 2022-02-22 PROCEDURE — 25010000002 PROCHLORPERAZINE 10 MG/2ML SOLUTION: Performed by: OBSTETRICS & GYNECOLOGY

## 2022-02-22 PROCEDURE — 59514 CESAREAN DELIVERY ONLY: CPT | Performed by: OBSTETRICS & GYNECOLOGY

## 2022-02-22 PROCEDURE — 25010000002 ONDANSETRON PER 1 MG: Performed by: OBSTETRICS & GYNECOLOGY

## 2022-02-22 DEVICE — SEAL HEMO SURG ARISTA/AH ABS/PWDR 3GM: Type: IMPLANTABLE DEVICE | Site: UTERUS | Status: FUNCTIONAL

## 2022-02-22 RX ORDER — SIMETHICONE 80 MG
80 TABLET,CHEWABLE ORAL 4 TIMES DAILY PRN
Status: DISCONTINUED | OUTPATIENT
Start: 2022-02-22 | End: 2022-02-24 | Stop reason: HOSPADM

## 2022-02-22 RX ORDER — ONDANSETRON 4 MG/1
4 TABLET, FILM COATED ORAL EVERY 6 HOURS PRN
Status: DISCONTINUED | OUTPATIENT
Start: 2022-02-22 | End: 2022-02-22 | Stop reason: HOSPADM

## 2022-02-22 RX ORDER — SODIUM CHLORIDE 0.9 % (FLUSH) 0.9 %
3-10 SYRINGE (ML) INJECTION AS NEEDED
Status: DISCONTINUED | OUTPATIENT
Start: 2022-02-22 | End: 2022-02-22 | Stop reason: HOSPADM

## 2022-02-22 RX ORDER — DIPHENHYDRAMINE HCL 25 MG
25 CAPSULE ORAL EVERY 4 HOURS PRN
Status: DISCONTINUED | OUTPATIENT
Start: 2022-02-22 | End: 2022-02-24 | Stop reason: HOSPADM

## 2022-02-22 RX ORDER — PROMETHAZINE HYDROCHLORIDE 12.5 MG/1
12.5 SUPPOSITORY RECTAL EVERY 6 HOURS PRN
Status: DISCONTINUED | OUTPATIENT
Start: 2022-02-22 | End: 2022-02-22

## 2022-02-22 RX ORDER — SODIUM CHLORIDE, SODIUM LACTATE, POTASSIUM CHLORIDE, CALCIUM CHLORIDE 600; 310; 30; 20 MG/100ML; MG/100ML; MG/100ML; MG/100ML
INJECTION, SOLUTION INTRAVENOUS
Status: COMPLETED
Start: 2022-02-22 | End: 2022-02-22

## 2022-02-22 RX ORDER — OXYTOCIN/0.9 % SODIUM CHLORIDE 30/500 ML
650 PLASTIC BAG, INJECTION (ML) INTRAVENOUS ONCE
Status: DISCONTINUED | OUTPATIENT
Start: 2022-02-22 | End: 2022-02-24 | Stop reason: HOSPADM

## 2022-02-22 RX ORDER — PROMETHAZINE HYDROCHLORIDE 12.5 MG/1
12.5 TABLET ORAL EVERY 6 HOURS PRN
Status: DISCONTINUED | OUTPATIENT
Start: 2022-02-22 | End: 2022-02-22

## 2022-02-22 RX ORDER — METHYLERGONOVINE MALEATE 0.2 MG/ML
200 INJECTION INTRAVENOUS ONCE AS NEEDED
Status: DISCONTINUED | OUTPATIENT
Start: 2022-02-22 | End: 2022-02-22 | Stop reason: HOSPADM

## 2022-02-22 RX ORDER — ACETAMINOPHEN 500 MG
1000 TABLET ORAL EVERY 8 HOURS
Status: DISCONTINUED | OUTPATIENT
Start: 2022-02-22 | End: 2022-02-24 | Stop reason: HOSPADM

## 2022-02-22 RX ORDER — DIPHENHYDRAMINE HCL 25 MG
25 CAPSULE ORAL ONCE AS NEEDED
Status: COMPLETED | OUTPATIENT
Start: 2022-02-22 | End: 2022-02-22

## 2022-02-22 RX ORDER — BUPIVACAINE HCL/0.9 % NACL/PF 0.1 %
2 PLASTIC BAG, INJECTION (ML) EPIDURAL ONCE
Status: COMPLETED | OUTPATIENT
Start: 2022-02-22 | End: 2022-02-22

## 2022-02-22 RX ORDER — OXYCODONE HYDROCHLORIDE 10 MG/1
10 TABLET ORAL EVERY 6 HOURS PRN
Status: DISCONTINUED | OUTPATIENT
Start: 2022-02-22 | End: 2022-02-24 | Stop reason: HOSPADM

## 2022-02-22 RX ORDER — SODIUM CHLORIDE, SODIUM LACTATE, POTASSIUM CHLORIDE, CALCIUM CHLORIDE 600; 310; 30; 20 MG/100ML; MG/100ML; MG/100ML; MG/100ML
INJECTION, SOLUTION INTRAVENOUS CONTINUOUS PRN
Status: DISCONTINUED | OUTPATIENT
Start: 2022-02-22 | End: 2022-02-22 | Stop reason: SURG

## 2022-02-22 RX ORDER — OXYTOCIN/0.9 % SODIUM CHLORIDE 30/500 ML
85 PLASTIC BAG, INJECTION (ML) INTRAVENOUS ONCE
Status: COMPLETED | OUTPATIENT
Start: 2022-02-22 | End: 2022-02-22

## 2022-02-22 RX ORDER — LIDOCAINE HYDROCHLORIDE 10 MG/ML
5 INJECTION, SOLUTION EPIDURAL; INFILTRATION; INTRACAUDAL; PERINEURAL AS NEEDED
Status: DISCONTINUED | OUTPATIENT
Start: 2022-02-22 | End: 2022-02-22

## 2022-02-22 RX ORDER — OXYCODONE HYDROCHLORIDE 5 MG/1
5 TABLET ORAL EVERY 4 HOURS PRN
Status: DISCONTINUED | OUTPATIENT
Start: 2022-02-22 | End: 2022-02-24 | Stop reason: HOSPADM

## 2022-02-22 RX ORDER — ONDANSETRON 2 MG/ML
INJECTION INTRAMUSCULAR; INTRAVENOUS AS NEEDED
Status: DISCONTINUED | OUTPATIENT
Start: 2022-02-22 | End: 2022-02-22 | Stop reason: SURG

## 2022-02-22 RX ORDER — CARBOPROST TROMETHAMINE 250 UG/ML
250 INJECTION, SOLUTION INTRAMUSCULAR AS NEEDED
Status: DISCONTINUED | OUTPATIENT
Start: 2022-02-22 | End: 2022-02-22 | Stop reason: SDUPTHER

## 2022-02-22 RX ORDER — KETOROLAC TROMETHAMINE 30 MG/ML
30 INJECTION, SOLUTION INTRAMUSCULAR; INTRAVENOUS EVERY 6 HOURS
Status: COMPLETED | OUTPATIENT
Start: 2022-02-22 | End: 2022-02-23

## 2022-02-22 RX ORDER — ONDANSETRON 2 MG/ML
4 INJECTION INTRAMUSCULAR; INTRAVENOUS ONCE AS NEEDED
Status: DISCONTINUED | OUTPATIENT
Start: 2022-02-22 | End: 2022-02-24 | Stop reason: HOSPADM

## 2022-02-22 RX ORDER — PROMETHAZINE HYDROCHLORIDE 12.5 MG/1
12.5 TABLET ORAL EVERY 6 HOURS PRN
Status: DISCONTINUED | OUTPATIENT
Start: 2022-02-22 | End: 2022-02-22 | Stop reason: HOSPADM

## 2022-02-22 RX ORDER — IBUPROFEN 800 MG/1
800 TABLET ORAL EVERY 8 HOURS
Status: DISCONTINUED | OUTPATIENT
Start: 2022-02-23 | End: 2022-02-24 | Stop reason: HOSPADM

## 2022-02-22 RX ORDER — LIDOCAINE HYDROCHLORIDE 10 MG/ML
5 INJECTION, SOLUTION EPIDURAL; INFILTRATION; INTRACAUDAL; PERINEURAL AS NEEDED
Status: DISCONTINUED | OUTPATIENT
Start: 2022-02-22 | End: 2022-02-22 | Stop reason: HOSPADM

## 2022-02-22 RX ORDER — SCOLOPAMINE TRANSDERMAL SYSTEM 1 MG/1
1 PATCH, EXTENDED RELEASE TRANSDERMAL
Status: DISCONTINUED | OUTPATIENT
Start: 2022-02-22 | End: 2022-02-24 | Stop reason: HOSPADM

## 2022-02-22 RX ORDER — BUPIVACAINE HYDROCHLORIDE 2.5 MG/ML
INJECTION, SOLUTION EPIDURAL; INFILTRATION; INTRACAUDAL AS NEEDED
Status: DISCONTINUED | OUTPATIENT
Start: 2022-02-22 | End: 2022-02-24 | Stop reason: HOSPADM

## 2022-02-22 RX ORDER — PROCHLORPERAZINE 25 MG
25 SUPPOSITORY, RECTAL RECTAL ONCE
Status: COMPLETED | OUTPATIENT
Start: 2022-02-22 | End: 2022-02-22

## 2022-02-22 RX ORDER — PROCHLORPERAZINE EDISYLATE 5 MG/ML
5 INJECTION INTRAMUSCULAR; INTRAVENOUS ONCE
Status: COMPLETED | OUTPATIENT
Start: 2022-02-22 | End: 2022-02-22

## 2022-02-22 RX ORDER — METHYLERGONOVINE MALEATE 0.2 MG/ML
200 INJECTION INTRAVENOUS ONCE AS NEEDED
Status: DISCONTINUED | OUTPATIENT
Start: 2022-02-22 | End: 2022-02-22 | Stop reason: SDUPTHER

## 2022-02-22 RX ORDER — SODIUM CHLORIDE 0.9 % (FLUSH) 0.9 %
3 SYRINGE (ML) INJECTION EVERY 12 HOURS SCHEDULED
Status: DISCONTINUED | OUTPATIENT
Start: 2022-02-22 | End: 2022-02-22 | Stop reason: HOSPADM

## 2022-02-22 RX ORDER — SODIUM CHLORIDE, SODIUM LACTATE, POTASSIUM CHLORIDE, CALCIUM CHLORIDE 600; 310; 30; 20 MG/100ML; MG/100ML; MG/100ML; MG/100ML
125 INJECTION, SOLUTION INTRAVENOUS CONTINUOUS
Status: DISCONTINUED | OUTPATIENT
Start: 2022-02-22 | End: 2022-02-22 | Stop reason: HOSPADM

## 2022-02-22 RX ORDER — OXYTOCIN/0.9 % SODIUM CHLORIDE 30/500 ML
PLASTIC BAG, INJECTION (ML) INTRAVENOUS CONTINUOUS PRN
Status: DISCONTINUED | OUTPATIENT
Start: 2022-02-22 | End: 2022-02-22 | Stop reason: SURG

## 2022-02-22 RX ORDER — SODIUM CHLORIDE 0.9 % (FLUSH) 0.9 %
3-10 SYRINGE (ML) INJECTION AS NEEDED
Status: DISCONTINUED | OUTPATIENT
Start: 2022-02-22 | End: 2022-02-22

## 2022-02-22 RX ORDER — OXYTOCIN/0.9 % SODIUM CHLORIDE 30/500 ML
85 PLASTIC BAG, INJECTION (ML) INTRAVENOUS ONCE
Status: DISCONTINUED | OUTPATIENT
Start: 2022-02-22 | End: 2022-02-24 | Stop reason: HOSPADM

## 2022-02-22 RX ORDER — MISOPROSTOL 200 UG/1
800 TABLET ORAL AS NEEDED
Status: DISCONTINUED | OUTPATIENT
Start: 2022-02-22 | End: 2022-02-22 | Stop reason: SDUPTHER

## 2022-02-22 RX ORDER — MISOPROSTOL 200 UG/1
800 TABLET ORAL AS NEEDED
Status: DISCONTINUED | OUTPATIENT
Start: 2022-02-22 | End: 2022-02-22 | Stop reason: HOSPADM

## 2022-02-22 RX ORDER — SODIUM CHLORIDE 0.9 % (FLUSH) 0.9 %
3 SYRINGE (ML) INJECTION EVERY 12 HOURS SCHEDULED
Status: DISCONTINUED | OUTPATIENT
Start: 2022-02-22 | End: 2022-02-22

## 2022-02-22 RX ORDER — OXYTOCIN/0.9 % SODIUM CHLORIDE 30/500 ML
650 PLASTIC BAG, INJECTION (ML) INTRAVENOUS ONCE
Status: DISCONTINUED | OUTPATIENT
Start: 2022-02-22 | End: 2022-02-22 | Stop reason: HOSPADM

## 2022-02-22 RX ORDER — DIPHENOXYLATE HYDROCHLORIDE AND ATROPINE SULFATE 2.5; .025 MG/1; MG/1
2 TABLET ORAL ONCE
Status: COMPLETED | OUTPATIENT
Start: 2022-02-22 | End: 2022-02-22

## 2022-02-22 RX ORDER — CARBOPROST TROMETHAMINE 250 UG/ML
250 INJECTION, SOLUTION INTRAMUSCULAR AS NEEDED
Status: DISCONTINUED | OUTPATIENT
Start: 2022-02-22 | End: 2022-02-22 | Stop reason: HOSPADM

## 2022-02-22 RX ORDER — ONDANSETRON 2 MG/ML
4 INJECTION INTRAMUSCULAR; INTRAVENOUS ONCE
Status: COMPLETED | OUTPATIENT
Start: 2022-02-22 | End: 2022-02-22

## 2022-02-22 RX ORDER — CARBOPROST TROMETHAMINE 250 UG/ML
INJECTION, SOLUTION INTRAMUSCULAR AS NEEDED
Status: DISCONTINUED | OUTPATIENT
Start: 2022-02-22 | End: 2022-02-24 | Stop reason: HOSPADM

## 2022-02-22 RX ORDER — HYDROCORTISONE 25 MG/G
CREAM TOPICAL 3 TIMES DAILY PRN
Status: DISCONTINUED | OUTPATIENT
Start: 2022-02-22 | End: 2022-02-24 | Stop reason: HOSPADM

## 2022-02-22 RX ORDER — BUPIVACAINE HYDROCHLORIDE 7.5 MG/ML
INJECTION, SOLUTION EPIDURAL; RETROBULBAR
Status: COMPLETED | OUTPATIENT
Start: 2022-02-22 | End: 2022-02-22

## 2022-02-22 RX ORDER — PRENATAL VIT/IRON FUM/FOLIC AC 27MG-0.8MG
1 TABLET ORAL DAILY
Status: DISCONTINUED | OUTPATIENT
Start: 2022-02-22 | End: 2022-02-24 | Stop reason: HOSPADM

## 2022-02-22 RX ORDER — ONDANSETRON 2 MG/ML
4 INJECTION INTRAMUSCULAR; INTRAVENOUS EVERY 6 HOURS PRN
Status: DISCONTINUED | OUTPATIENT
Start: 2022-02-22 | End: 2022-02-22 | Stop reason: HOSPADM

## 2022-02-22 RX ORDER — PROCHLORPERAZINE MALEATE 5 MG/1
5 TABLET ORAL ONCE
Status: COMPLETED | OUTPATIENT
Start: 2022-02-22 | End: 2022-02-22

## 2022-02-22 RX ORDER — PROMETHAZINE HYDROCHLORIDE 12.5 MG/1
12.5 SUPPOSITORY RECTAL EVERY 6 HOURS PRN
Status: DISCONTINUED | OUTPATIENT
Start: 2022-02-22 | End: 2022-02-22 | Stop reason: HOSPADM

## 2022-02-22 RX ORDER — MORPHINE SULFATE 1 MG/ML
INJECTION, SOLUTION EPIDURAL; INTRATHECAL; INTRAVENOUS AS NEEDED
Status: DISCONTINUED | OUTPATIENT
Start: 2022-02-22 | End: 2022-02-22 | Stop reason: SURG

## 2022-02-22 RX ORDER — TRISODIUM CITRATE DIHYDRATE AND CITRIC ACID MONOHYDRATE 500; 334 MG/5ML; MG/5ML
30 SOLUTION ORAL ONCE
Status: COMPLETED | OUTPATIENT
Start: 2022-02-22 | End: 2022-02-22

## 2022-02-22 RX ADMIN — OXYTOCIN-SODIUM CHLORIDE 0.9% IV SOLN 30 UNIT/500ML 85 ML/HR: 30-0.9/5 SOLUTION at 16:06

## 2022-02-22 RX ADMIN — SCOLOPAMINE TRANSDERMAL SYSTEM 1 PATCH: 1 PATCH, EXTENDED RELEASE TRANSDERMAL at 17:40

## 2022-02-22 RX ADMIN — ONDANSETRON 4 MG: 2 INJECTION INTRAMUSCULAR; INTRAVENOUS at 12:08

## 2022-02-22 RX ADMIN — PRENATAL VIT W/ FE FUMARATE-FA TAB 27-0.8 MG 1 TABLET: 27-0.8 TAB at 20:22

## 2022-02-22 RX ADMIN — DIPHENHYDRAMINE HYDROCHLORIDE 25 MG: 25 CAPSULE ORAL at 22:32

## 2022-02-22 RX ADMIN — SODIUM CHLORIDE, SODIUM LACTATE, POTASSIUM CHLORIDE, CALCIUM CHLORIDE 125 ML/HR: 600; 310; 30; 20 INJECTION, SOLUTION INTRAVENOUS at 06:23

## 2022-02-22 RX ADMIN — SODIUM CHLORIDE, POTASSIUM CHLORIDE, SODIUM LACTATE AND CALCIUM CHLORIDE: 600; 310; 30; 20 INJECTION, SOLUTION INTRAVENOUS at 12:08

## 2022-02-22 RX ADMIN — BUPIVACAINE HYDROCHLORIDE 1.6 ML: 7.5 INJECTION, SOLUTION EPIDURAL; RETROBULBAR at 12:18

## 2022-02-22 RX ADMIN — SODIUM CHLORIDE, POTASSIUM CHLORIDE, SODIUM LACTATE AND CALCIUM CHLORIDE: 600; 310; 30; 20 INJECTION, SOLUTION INTRAVENOUS at 12:57

## 2022-02-22 RX ADMIN — SODIUM CHLORIDE, POTASSIUM CHLORIDE, SODIUM LACTATE AND CALCIUM CHLORIDE 125 ML/HR: 600; 310; 30; 20 INJECTION, SOLUTION INTRAVENOUS at 06:23

## 2022-02-22 RX ADMIN — PROCHLORPERAZINE EDISYLATE 5 MG: 5 INJECTION INTRAMUSCULAR; INTRAVENOUS at 17:53

## 2022-02-22 RX ADMIN — SODIUM CITRATE AND CITRIC ACID MONOHYDRATE 30 ML: 500; 334 SOLUTION ORAL at 11:42

## 2022-02-22 RX ADMIN — ONDANSETRON 4 MG: 2 INJECTION INTRAMUSCULAR; INTRAVENOUS at 17:39

## 2022-02-22 RX ADMIN — ONDANSETRON 4 MG: 2 INJECTION INTRAMUSCULAR; INTRAVENOUS at 15:59

## 2022-02-22 RX ADMIN — ONDANSETRON 4 MG: 2 INJECTION INTRAMUSCULAR; INTRAVENOUS at 12:43

## 2022-02-22 RX ADMIN — DIPHENOXYLATE HYDROCHLORIDE AND ATROPINE SULFATE 2 TABLET: 2.5; .025 TABLET ORAL at 14:28

## 2022-02-22 RX ADMIN — Medication 2 G: at 12:18

## 2022-02-22 RX ADMIN — ACETAMINOPHEN 1000 MG: 500 TABLET, FILM COATED ORAL at 18:27

## 2022-02-22 RX ADMIN — OXYTOCIN-SODIUM CHLORIDE 0.9% IV SOLN 30 UNIT/500ML 650 ML/HR: 30-0.9/5 SOLUTION at 12:35

## 2022-02-22 RX ADMIN — Medication 0.2 MG: at 12:18

## 2022-02-22 RX ADMIN — KETOROLAC TROMETHAMINE 30 MG: 30 INJECTION, SOLUTION INTRAMUSCULAR; INTRAVENOUS at 18:27

## 2022-02-22 RX ADMIN — DIPHENHYDRAMINE HYDROCHLORIDE 25 MG: 25 CAPSULE ORAL at 18:27

## 2022-02-22 NOTE — ANESTHESIA PREPROCEDURE EVALUATION
Anesthesia Evaluation     NPO Solid Status: > 8 hours  NPO Liquid Status: > 6 hours           Airway   Mallampati: I  TM distance: >3 FB  Neck ROM: full  Dental      Pulmonary - normal exam   (+) asthma,  Cardiovascular - negative cardio ROS and normal exam        Neuro/Psych  (+) headaches, psychiatric history Anxiety,    GI/Hepatic/Renal/Endo      Musculoskeletal     Abdominal    Substance History   (+) alcohol use (not currently),      OB/GYN    (+) Pregnant,     Comment: HX post partum hemorrhage      Other        ROS/Med Hx Other: Genital herpes                  Anesthesia Plan    ASA 3     spinal       Anesthetic plan, all risks, benefits, and alternatives have been provided, discussed and informed consent has been obtained with: patient.  Use of blood products discussed with patient  Consented to blood products.       CODE STATUS:    Code Status (Patient has no pulse and is not breathing): CPR (Attempt to Resuscitate)  Medical Interventions (Patient has pulse or is breathing): Full

## 2022-02-22 NOTE — ANESTHESIA POSTPROCEDURE EVALUATION
Patient: Clifton Macdonald    Procedure Summary     Date: 22 Room / Location: [facility] LABOR DELIVERY 1 / [facility] LABOR DELIVERY    Anesthesia Start: 1208 Anesthesia Stop: 1340    Procedure:  SECTION PRIMARY (N/A Abdomen) Diagnosis:       Poor fetal growth affecting management of mother in third trimester, single or unspecified fetus      (Poor fetal growth affecting management of mother in third trimester, single or unspecified fetus [O36.5930])    Surgeons: Lorenzo Briceno MD Provider: Pablo Coreas CRNA    Anesthesia Type: spinal ASA Status: 3          Anesthesia Type: spinal    Vitals  No vitals data found for the desired time range.          Post Anesthesia Care and Evaluation    Patient location during evaluation: PACU  Level of consciousness: awake and alert  Pain score: 0  Pain management: adequate  Airway patency: patent  Anesthetic complications: No anesthetic complications  PONV Status: none  Cardiovascular status: acceptable and hemodynamically stable  Respiratory status: acceptable and spontaneous ventilation  Hydration status: acceptable  Post Neuraxial Block status: No signs or symptoms of PDPH  Comments: Sensation returning but no motor movement in lower extremities yet.

## 2022-02-22 NOTE — ANESTHESIA PROCEDURE NOTES
Spinal Block    Pre-sedation assessment completed: 2/22/2022 12:08 PM    Patient reassessed immediately prior to procedure    Patient location during procedure: OR  Start Time: 2/22/2022 12:14 PM  Stop Time: 2/22/2022 12:18 PM  Indication:procedure for pain  Performed By  CRNA: Pablo Coreas CRNA  Preanesthetic Checklist  Completed: patient identified, IV checked, site marked, risks and benefits discussed, surgical consent, monitors and equipment checked, pre-op evaluation and timeout performed  Spinal Block Prep:  Patient Position:sitting  Sterile Tech:cap, gloves, sterile barriers and mask  Prep:Betadine  Patient Monitoring:EKG, continuous pulse oximetry and blood pressure monitoring  Spinal Block Procedure  Approach:midline  Guidance:landmark technique and palpation technique  Location:L4-L5  Needle Type:Pencan  Needle Gauge:24 G  Placement of Spinal needle event:cerebrospinal fluid aspirated  Paresthesia: no  Fluid Appearance:clear  Medications: bupivacaine PF (MARCAINE) 0.75 % injection, 1.6 mL  Med Administered at 2/22/2022 12:18 PM   Post Assessment  Patient Tolerance:patient tolerated the procedure well with no apparent complications  Complications no

## 2022-02-23 DIAGNOSIS — O09.521 MULTIGRAVIDA OF ADVANCED MATERNAL AGE IN FIRST TRIMESTER: ICD-10-CM

## 2022-02-23 LAB
HCT VFR BLD AUTO: 33.6 % (ref 34–46.6)
HGB BLD-MCNC: 11.5 G/DL (ref 12–15.9)

## 2022-02-23 PROCEDURE — 85014 HEMATOCRIT: CPT | Performed by: OBSTETRICS & GYNECOLOGY

## 2022-02-23 PROCEDURE — 25010000002 KETOROLAC TROMETHAMINE PER 15 MG: Performed by: OBSTETRICS & GYNECOLOGY

## 2022-02-23 PROCEDURE — 85018 HEMOGLOBIN: CPT | Performed by: OBSTETRICS & GYNECOLOGY

## 2022-02-23 PROCEDURE — 94799 UNLISTED PULMONARY SVC/PX: CPT

## 2022-02-23 PROCEDURE — 63710000001 DIPHENHYDRAMINE PER 50 MG: Performed by: OBSTETRICS & GYNECOLOGY

## 2022-02-23 PROCEDURE — 0503F POSTPARTUM CARE VISIT: CPT | Performed by: OBSTETRICS & GYNECOLOGY

## 2022-02-23 RX ADMIN — PRENATAL VIT W/ FE FUMARATE-FA TAB 27-0.8 MG 1 TABLET: 27-0.8 TAB at 09:24

## 2022-02-23 RX ADMIN — OXYCODONE 5 MG: 5 TABLET ORAL at 13:54

## 2022-02-23 RX ADMIN — DIPHENHYDRAMINE HYDROCHLORIDE 25 MG: 25 CAPSULE ORAL at 05:17

## 2022-02-23 RX ADMIN — KETOROLAC TROMETHAMINE 30 MG: 30 INJECTION, SOLUTION INTRAMUSCULAR; INTRAVENOUS at 07:05

## 2022-02-23 RX ADMIN — ACETAMINOPHEN 1000 MG: 500 TABLET, FILM COATED ORAL at 18:10

## 2022-02-23 RX ADMIN — IBUPROFEN 800 MG: 800 TABLET, FILM COATED ORAL at 19:09

## 2022-02-23 RX ADMIN — KETOROLAC TROMETHAMINE 30 MG: 30 INJECTION, SOLUTION INTRAMUSCULAR; INTRAVENOUS at 12:43

## 2022-02-23 RX ADMIN — KETOROLAC TROMETHAMINE 30 MG: 30 INJECTION, SOLUTION INTRAMUSCULAR; INTRAVENOUS at 01:00

## 2022-02-23 RX ADMIN — ACETAMINOPHEN 1000 MG: 500 TABLET, FILM COATED ORAL at 01:00

## 2022-02-23 RX ADMIN — ACETAMINOPHEN 1000 MG: 500 TABLET, FILM COATED ORAL at 09:24

## 2022-02-23 RX ADMIN — OXYCODONE 5 MG: 5 TABLET ORAL at 09:24

## 2022-02-23 RX ADMIN — OXYCODONE 5 MG: 5 TABLET ORAL at 18:10

## 2022-02-23 RX ADMIN — OXYCODONE 5 MG: 5 TABLET ORAL at 23:29

## 2022-02-24 VITALS
HEART RATE: 93 BPM | OXYGEN SATURATION: 97 % | RESPIRATION RATE: 18 BRPM | DIASTOLIC BLOOD PRESSURE: 78 MMHG | TEMPERATURE: 98 F | SYSTOLIC BLOOD PRESSURE: 116 MMHG

## 2022-02-24 PROBLEM — Z3A.38 38 WEEKS GESTATION OF PREGNANCY: Status: ACTIVE | Noted: 2022-02-24

## 2022-02-24 LAB
LAB AP CASE REPORT: NORMAL
PATH REPORT.FINAL DX SPEC: NORMAL

## 2022-02-24 PROCEDURE — 0503F POSTPARTUM CARE VISIT: CPT | Performed by: OBSTETRICS & GYNECOLOGY

## 2022-02-24 RX ORDER — OXYCODONE HYDROCHLORIDE 5 MG/1
TABLET ORAL
Qty: 25 TABLET | Refills: 0 | Status: SHIPPED | OUTPATIENT
Start: 2022-02-24

## 2022-02-24 RX ORDER — ACETAMINOPHEN 500 MG
1000 TABLET ORAL EVERY 8 HOURS
Qty: 84 TABLET | Refills: 0
Start: 2022-02-24 | End: 2022-03-10

## 2022-02-24 RX ORDER — SCOLOPAMINE TRANSDERMAL SYSTEM 1 MG/1
1 PATCH, EXTENDED RELEASE TRANSDERMAL
Qty: 4 EACH | Refills: 2 | Status: SHIPPED | OUTPATIENT
Start: 2022-02-25

## 2022-02-24 RX ORDER — IBUPROFEN 200 MG
800 TABLET ORAL EVERY 8 HOURS
Qty: 168 TABLET | Refills: 0
Start: 2022-02-24 | End: 2022-03-10

## 2022-02-24 RX ADMIN — OXYCODONE HYDROCHLORIDE 10 MG: 10 TABLET ORAL at 03:37

## 2022-02-24 RX ADMIN — ACETAMINOPHEN 1000 MG: 500 TABLET, FILM COATED ORAL at 01:24

## 2022-02-24 RX ADMIN — PRENATAL VIT W/ FE FUMARATE-FA TAB 27-0.8 MG 1 TABLET: 27-0.8 TAB at 09:01

## 2022-02-24 RX ADMIN — ACETAMINOPHEN 1000 MG: 500 TABLET, FILM COATED ORAL at 09:01

## 2022-02-24 RX ADMIN — OXYCODONE HYDROCHLORIDE 10 MG: 10 TABLET ORAL at 10:56

## 2022-02-24 RX ADMIN — IBUPROFEN 800 MG: 800 TABLET, FILM COATED ORAL at 03:37

## 2022-02-26 LAB
BH BB BLOOD EXPIRATION DATE: NORMAL
BH BB BLOOD EXPIRATION DATE: NORMAL
BH BB BLOOD TYPE BARCODE: NORMAL
BH BB BLOOD TYPE BARCODE: NORMAL
BH BB DISPENSE STATUS: NORMAL
BH BB DISPENSE STATUS: NORMAL
BH BB PRODUCT CODE: NORMAL
BH BB PRODUCT CODE: NORMAL
BH BB UNIT NUMBER: NORMAL
BH BB UNIT NUMBER: NORMAL
CROSSMATCH INTERPRETATION: NORMAL
CROSSMATCH INTERPRETATION: NORMAL
UNIT  ABO: NORMAL
UNIT  ABO: NORMAL
UNIT  RH: NORMAL
UNIT  RH: NORMAL

## 2022-03-10 ENCOUNTER — POSTPARTUM VISIT (OUTPATIENT)
Dept: OBSTETRICS AND GYNECOLOGY | Facility: CLINIC | Age: 42
End: 2022-03-10

## 2022-03-10 VITALS
HEIGHT: 68 IN | DIASTOLIC BLOOD PRESSURE: 84 MMHG | WEIGHT: 191.6 LBS | BODY MASS INDEX: 29.04 KG/M2 | SYSTOLIC BLOOD PRESSURE: 120 MMHG

## 2022-03-10 DIAGNOSIS — Z98.890 POSTOPERATIVE STATE: Primary | ICD-10-CM

## 2022-03-10 PROCEDURE — 0503F POSTPARTUM CARE VISIT: CPT | Performed by: OBSTETRICS & GYNECOLOGY

## 2022-03-10 NOTE — PROGRESS NOTES
Clifton Macdonald is a 41 y.o. y/o female.     Chief Complaint: Postpartum postop     HPI:   41 y.o. .  Patient's last menstrual period was 2021 (approximate)..  Patient is doing well no complaints normal bowel and bladder function          Review of Systems     Constitutional: Denies night sweats    HENT: No hearing changes, denies ear pain    Eye: No eye pain; no foreign body in eye    Pulmonary: No hemoptysis    Cardiovascular: No claudication    GI: No hematemesis    Musculoskeletal: No arthralgias, no joint swelling    Endocrine: No polydipsia or polyuria    Hematologic: Denies any free bleeding    Psychiatric: Denies any delusions    The following portions of the patient's history were reviewed and updated as appropriate: allergies, current medications, past family history, past medical history, past social history, past surgical history and problem list.    Allergies   Allergen Reactions   • Neomycin Rash        Outpatient Medications Prior to Visit   Medication Sig Dispense Refill   • acetaminophen (TYLENOL) 500 MG tablet Take 2 tablets by mouth Every 8 (Eight) Hours for 14 days. 84 tablet 0   • Cyanocobalamin (B-12 Compliance Injection) 1000 MCG/ML kit Inject  as directed.     • ibuprofen (Advil) 200 MG tablet Take 4 tablets by mouth Every 8 (Eight) Hours for 14 days. 168 tablet 0   • Lifitegrast (XIIDRA OP) Apply  to eye(s) as directed by provider.     • oxyCODONE (ROXICODONE) 5 MG immediate release tablet 1-2 every 4-6 hours as needed 25 tablet 0   • Prenatal Vit-Fe Fumarate-FA (PRENATAL VITAMIN 27-0.8) 27-0.8 MG tablet tablet Take  by mouth Daily.     • valACYclovir (VALTREX) 500 MG tablet Take 1 tablet by mouth 2 (Two) Times a Day. 60 tablet 2   • Vitamin D, Cholecalciferol, (CHOLECALCIFEROL) 10 MCG (400 UNIT) tablet Take 400 Units by mouth Daily.     • Acetylcysteine (NAC) capsule capsule Take 600 mg by mouth 1 (One) Time.     • aspirin 81 MG chewable tablet Chew 81 mg Daily.     •  diphenhydrAMINE (BENADRYL) 25 mg capsule Take 25 mg by mouth Every 6 (Six) Hours As Needed for Itching.     • QUERCETIN PO Take  by mouth.     • Scopolamine 1 MG/3DAYS patch Place 1 patch on the skin as directed by provider Every 72 (Seventy-Two) Hours. 4 each 2     No facility-administered medications prior to visit.        The patient has a family history of   Family History   Problem Relation Age of Onset   • Osteoarthritis Father    • Rheum arthritis Mother    • No Known Problems Brother    • No Known Problems Son    • Heart defect Daughter    • Rheum arthritis Maternal Grandmother    • No Known Problems Daughter    • Breast cancer Maternal Aunt    • Breast cancer Maternal Aunt    • Breast cancer Maternal Aunt    • Uterine cancer Maternal Aunt         Past Medical History:   Diagnosis Date   • Anxiety    • Asthma     exercise induced asthma   • B12 deficiency    • Fibrocystic breast    • Herpes    • Migraine    • Varicella         OB History        6    Para   5    Term   4       1    AB   1    Living   4       SAB   1    IAB   0    Ectopic   0    Molar   0    Multiple   0    Live Births   4                 Social History     Socioeconomic History   • Marital status:      Spouse name: Gorge   Tobacco Use   • Smoking status: Never Smoker   • Smokeless tobacco: Never Used   Substance and Sexual Activity   • Alcohol use: Not Currently     Alcohol/week: 0.0 standard drinks     Comment: socially   • Drug use: No   • Sexual activity: Yes     Partners: Male     Comment: last pap smear 18 negative         Past Surgical History:   Procedure Laterality Date   • BREAST AUGMENTATION     •  SECTION N/A 2022    Procedure:  SECTION PRIMARY;  Surgeon: Lorenzo Briceno MD;  Location: [facility] LABOR DELIVERY;  Service: Obstetrics/Gynecology;  Laterality: N/A;   • CYSTECTOMY  2011    breast   • D & C WITH SUCTION     • DILATATION AND CURETTAGE     • OTHER SURGICAL HISTORY  10/2018    IVF  "  • VULVA SURGERY          Patient Active Problem List   Diagnosis   • Capsular contracture of breast implant   • Varicella   • Asthma affecting pregnancy in third trimester   • Family history of trisomy 18   • History of postpartum hemorrhage, currently pregnant   • Multigravida of advanced maternal age in first trimester   • Genital herpes affecting pregnancy in third trimester   • Supervision of high risk pregnancy in third trimester   • Pregnancy affected by fetal growth restriction   • Intrauterine growth restriction (IUGR) affecting care of mother   • Status post primary low transverse  section   • 38 weeks gestation of pregnancy        Documented Vitals    03/10/22 1058   BP: 120/84   Weight: 86.9 kg (191 lb 9.6 oz)   Height: 172.7 cm (68\")        Body mass index is 29.13 kg/m².    Physical Exam  Constitutional: Appears to be in no acute distress; Eyes: Sclerae normal; Endocrine system: Thyroid palpation is normal; Pulmonary system: Lungs clear; Cardiovascular system: Heart regular rate and rhythm; Gastrointestinal system: Abdomen soft and nontender, active bowel sounds; Urologic system: CVA negative; Psychiatric: Appropriate insight; Neurologic: Gait within normal limits incision appears to be healing well exofin removed    Laboratory Data:   No results for input(s): GLUCOSE, BUN, CREATININE, NA, K, CL, CO2, CALCIUM, PROTEINTOT, ALBUMIN, ALT, AST, ALKPHOS, BILITOT, EGFRIFNONA, GLOB, AGRATIO, BCR, ANIONGAP, BILIDIR, INDBILI in the last 19468 hours.  Lab Results - Last 18 Months   Lab Units 22  0540 22  0555 21  1423   WBC 10*3/mm3  --  7.96 8.21   RBC 10*6/mm3  --  4.07 4.64   HEMOGLOBIN g/dL 11.5* 12.9 14.8   HEMATOCRIT % 33.6* 36.7 43.1   MCV fL  --  90.2 92.9   MCH pg  --  31.7 31.9   MCHC g/dL  --  35.1 34.3   RDW %  --  13.2 11.9*   RDW-SD fl  --  43.2 40.5   MPV fL  --  10.2 11.1   PLATELETS 10*3/mm3  --  280 301     No results for input(s): HCGQUAL in the last 21857 " hours.    Assessment        Diagnosis Plan   1. Postoperative state   doing well see back in 4 weeks   2. Postpartum state           Plan       No orders of the defined types were placed in this encounter.            This document has been electronically signed by Lorenzo Briceno MD on March 10, 2022 13:57 CST    Please note that portions of this note were completed with a voice recognition program.

## 2022-03-30 ENCOUNTER — TELEPHONE (OUTPATIENT)
Dept: OBSTETRICS AND GYNECOLOGY | Facility: CLINIC | Age: 42
End: 2022-03-30

## 2022-03-30 RX ORDER — DESOGESTREL AND ETHINYL ESTRADIOL 0.15-0.03
1 KIT ORAL DAILY
Qty: 28 TABLET | Refills: 12 | Status: SHIPPED | OUTPATIENT
Start: 2022-03-30 | End: 2022-06-02

## 2022-03-30 NOTE — TELEPHONE ENCOUNTER
Patient wants to restart oral contraceptives at 6 weeks postpartum risk benefits alternatives reviewed

## 2022-04-07 ENCOUNTER — OFFICE VISIT (OUTPATIENT)
Dept: OBSTETRICS AND GYNECOLOGY | Facility: CLINIC | Age: 42
End: 2022-04-07

## 2022-04-07 PROCEDURE — 0503F POSTPARTUM CARE VISIT: CPT | Performed by: OBSTETRICS & GYNECOLOGY

## 2022-04-07 NOTE — PROGRESS NOTES
You have chosen to receive care through a telephone visit. Do you consent to use a telephone visit for your medical care today? Yes  This visit has been rescheduled as a phone visit to comply with patient safety concerns in accordance with CDC recommendations. Total time of discussion was 12 minutes.        Chief complaint: Postpartum    Patient is doing well no complaints has started on oral contraceptives doing well.  Will call for any problems ready to go back to work will fax return to work

## 2022-06-02 ENCOUNTER — TELEPHONE (OUTPATIENT)
Dept: OBSTETRICS AND GYNECOLOGY | Facility: CLINIC | Age: 42
End: 2022-06-02

## 2022-06-02 DIAGNOSIS — N93.9 ABNORMAL UTERINE BLEEDING (AUB): Primary | ICD-10-CM

## 2022-06-02 RX ORDER — NORETHINDRONE AND ETHINYL ESTRADIOL 1 MG-35MCG
1 KIT ORAL DAILY
Qty: 28 TABLET | Refills: 12 | Status: SHIPPED | OUTPATIENT
Start: 2022-06-02 | End: 2022-10-05

## 2022-06-02 NOTE — TELEPHONE ENCOUNTER
Patient calls having some breakthrough bleeding on low estrogen wants to increase to higher dose pill and will check CBC

## 2022-06-03 ENCOUNTER — LAB (OUTPATIENT)
Dept: LAB | Facility: HOSPITAL | Age: 42
End: 2022-06-03

## 2022-06-03 DIAGNOSIS — N93.9 ABNORMAL UTERINE BLEEDING (AUB): ICD-10-CM

## 2022-06-03 LAB
AUTO MIXED CELLS #: 0.5 10*3/MM3 (ref 0.1–2.6)
AUTO MIXED CELLS %: 6.4 % (ref 0.1–24)
ERYTHROCYTE [DISTWIDTH] IN BLOOD BY AUTOMATED COUNT: 12.2 % (ref 12.3–15.4)
HCT VFR BLD AUTO: 39.2 % (ref 34–46.6)
HGB BLD-MCNC: 13 G/DL (ref 12–15.9)
LYMPHOCYTES # BLD AUTO: 2.1 10*3/MM3 (ref 0.7–3.1)
LYMPHOCYTES NFR BLD AUTO: 28.9 % (ref 19.6–45.3)
MCH RBC QN AUTO: 31.3 PG (ref 26.6–33)
MCHC RBC AUTO-ENTMCNC: 33.2 G/DL (ref 31.5–35.7)
MCV RBC AUTO: 94.5 FL (ref 79–97)
NEUTROPHILS NFR BLD AUTO: 4.6 10*3/MM3 (ref 1.7–7)
NEUTROPHILS NFR BLD AUTO: 64.7 % (ref 42.7–76)
PLATELET # BLD AUTO: 318 10*3/MM3 (ref 140–450)
PMV BLD AUTO: 9.4 FL (ref 6–12)
RBC # BLD AUTO: 4.15 10*6/MM3 (ref 3.77–5.28)
WBC NRBC COR # BLD: 7.2 10*3/MM3 (ref 3.4–10.8)

## 2022-06-03 PROCEDURE — 36415 COLL VENOUS BLD VENIPUNCTURE: CPT

## 2022-06-03 PROCEDURE — 85025 COMPLETE CBC W/AUTO DIFF WBC: CPT

## 2022-10-05 RX ORDER — LEVONORGESTREL / ETHINYL ESTRADIOL AND ETHINYL ESTRADIOL 150-30(84)
1 KIT ORAL DAILY
Qty: 1 EACH | Refills: 5 | Status: SHIPPED | OUTPATIENT
Start: 2022-10-05 | End: 2023-10-05

## 2023-05-15 DIAGNOSIS — O98.313 GENITAL HERPES AFFECTING PREGNANCY IN THIRD TRIMESTER: ICD-10-CM

## 2023-05-15 DIAGNOSIS — A60.09 GENITAL HERPES AFFECTING PREGNANCY IN THIRD TRIMESTER: ICD-10-CM

## 2023-05-15 RX ORDER — VALACYCLOVIR HYDROCHLORIDE 500 MG/1
500 TABLET, FILM COATED ORAL DAILY
Qty: 30 TABLET | Refills: 11 | Status: SHIPPED | OUTPATIENT
Start: 2023-05-15

## 2024-12-13 ENCOUNTER — HOSPITAL ENCOUNTER (OUTPATIENT)
Dept: GENERAL RADIOLOGY | Facility: HOSPITAL | Age: 44
Discharge: HOME | End: 2024-12-13
Admitting: NURSE PRACTITIONER
Payer: COMMERCIAL

## 2024-12-13 ENCOUNTER — TRANSCRIBE ORDERS (OUTPATIENT)
Dept: ADMINISTRATIVE | Facility: HOSPITAL | Age: 44
End: 2024-12-13
Payer: COMMERCIAL

## 2024-12-13 DIAGNOSIS — M25.551 PAIN IN RIGHT HIP: Primary | ICD-10-CM

## 2024-12-13 DIAGNOSIS — M25.551 PAIN IN RIGHT HIP: ICD-10-CM

## 2024-12-13 PROCEDURE — 73502 X-RAY EXAM HIP UNI 2-3 VIEWS: CPT

## 2024-12-19 ENCOUNTER — TRANSCRIBE ORDERS (OUTPATIENT)
Dept: ADMINISTRATIVE | Facility: HOSPITAL | Age: 44
End: 2024-12-19
Payer: COMMERCIAL

## 2024-12-19 DIAGNOSIS — M25.551 RIGHT HIP PAIN: Primary | ICD-10-CM

## 2025-04-18 ENCOUNTER — TRANSCRIBE ORDERS (OUTPATIENT)
Dept: ADMINISTRATIVE | Facility: HOSPITAL | Age: 45
End: 2025-04-18
Payer: COMMERCIAL

## 2025-04-18 DIAGNOSIS — R20.2 PARESTHESIA OF SKIN: Primary | ICD-10-CM

## 2025-04-29 ENCOUNTER — TRANSCRIBE ORDERS (OUTPATIENT)
Dept: ADMINISTRATIVE | Facility: HOSPITAL | Age: 45
End: 2025-04-29
Payer: COMMERCIAL

## 2025-04-29 DIAGNOSIS — R20.2 PARESTHESIA OF SKIN: Primary | ICD-10-CM

## (undated) DEVICE — SUT MNCRYL 3/0 Y936H

## (undated) DEVICE — SUT MONOCRYL 4/0 PS2 27IN Y426H ETY426H

## (undated) DEVICE — DRP SURG UNIV BASIC BILAMINATE 53X77IN DISP

## (undated) DEVICE — STERILE POLYISOPRENE POWDER-FREE SURGICAL GLOVES WITH EMOLLIENT COATING: Brand: PROTEXIS

## (undated) DEVICE — DRSNG TELFA PAD NONADH STR 1S 3X8IN

## (undated) DEVICE — TBG PENCL TELESCP MEGADYNE SMOKE EVAC 10FT

## (undated) DEVICE — SUT GUT CHRM 1 CTX 36IN 905H

## (undated) DEVICE — 3M™ STERI-STRIP™ REINFORCED ADHESIVE SKIN CLOSURES, R1547, 1/2 IN X 4 IN (12 MM X 100 MM), 6 STRIPS/ENVELOPE: Brand: 3M™ STERI-STRIP™

## (undated) DEVICE — GLV SURG SIGNATURE ESSENTIAL PF LTX SZ7

## (undated) DEVICE — GLV SURG SENSICARE PI LF PF 7.5

## (undated) DEVICE — INTENDED FOR TISSUE SEPARATION, AND OTHER PROCEDURES THAT REQUIRE A SHARP SURGICAL BLADE TO PUNCTURE OR CUT.: Brand: BARD-PARKER ® CARBON RIB-BACK BLADES

## (undated) DEVICE — SUT VIC PLS 0 CTX 36IN UD VCP978H

## (undated) DEVICE — SYS CLS SKIN PREMIERPRO EXOFINFUSION 22CM

## (undated) DEVICE — SUT GUT CHRM 2/0 SH 27IN G123H

## (undated) DEVICE — TRY SPINE PENCAN 24GA X4IN

## (undated) DEVICE — GLV SURG MICROTOUCH LTX SZ7 STRL

## (undated) DEVICE — GLV SURG SIGNATURE ESSENTIAL PF LTX SZ7.5

## (undated) DEVICE — UNDYED BRAIDED (POLYGLACTIN 910), SYNTHETIC ABSORBABLE SUTURE: Brand: COATED VICRYL

## (undated) DEVICE — SYR LUERLOK 20CC BX/50

## (undated) DEVICE — NANOLINECORNERSTONESPROXIMAL END: ISO 80369-7: Brand: SONOPLEX

## (undated) DEVICE — PATIENT RETURN ELECTRODE, SINGLE-USE, CONTACT QUALITY MONITORING, ADULT, WITH 9FT CORD, FOR PATIENTS WEIGING OVER 33LBS. (15KG): Brand: MEGADYNE

## (undated) DEVICE — PK C/SECT 60

## (undated) DEVICE — GARMENT,MEDLINE,DVT,INT,CALF,MED, GEN2: Brand: MEDLINE

## (undated) DEVICE — ADHS LIQ MASTISOL 2/3ML

## (undated) DEVICE — SUT VIC 0 CTX 36IN J978H

## (undated) DEVICE — GLV SURG DERMASSURE GRN LF PF SZ 6.5

## (undated) DEVICE — PAD,ABDOMINAL,8"X10",ST,LF: Brand: MEDLINE

## (undated) DEVICE — SUT  GUT PLAIN 2/0 CT1 27IN 843H